# Patient Record
Sex: MALE | Race: WHITE | Employment: OTHER | URBAN - METROPOLITAN AREA
[De-identification: names, ages, dates, MRNs, and addresses within clinical notes are randomized per-mention and may not be internally consistent; named-entity substitution may affect disease eponyms.]

---

## 2017-03-23 ENCOUNTER — OFFICE VISIT (OUTPATIENT)
Dept: CARDIOLOGY | Facility: CLINIC | Age: 77
End: 2017-03-23
Attending: INTERNAL MEDICINE
Payer: COMMERCIAL

## 2017-03-23 VITALS
HEIGHT: 65 IN | SYSTOLIC BLOOD PRESSURE: 134 MMHG | WEIGHT: 214.5 LBS | BODY MASS INDEX: 35.74 KG/M2 | DIASTOLIC BLOOD PRESSURE: 70 MMHG | HEART RATE: 80 BPM

## 2017-03-23 DIAGNOSIS — I25.10 CORONARY ARTERY DISEASE INVOLVING NATIVE CORONARY ARTERY OF NATIVE HEART WITHOUT ANGINA PECTORIS: Primary | ICD-10-CM

## 2017-03-23 DIAGNOSIS — Z95.1 S/P CABG (CORONARY ARTERY BYPASS GRAFT): ICD-10-CM

## 2017-03-23 PROCEDURE — 99214 OFFICE O/P EST MOD 30 MIN: CPT | Performed by: INTERNAL MEDICINE

## 2017-03-23 RX ORDER — LOPERAMIDE HYDROCHLORIDE 2 MG/1
2 TABLET ORAL 2 TIMES DAILY PRN
COMMUNITY

## 2017-03-23 NOTE — MR AVS SNAPSHOT
"              After Visit Summary   3/23/2017    Anshul Arango    MRN: [id number]           Patient Information     Date Of Birth          1940        Visit Information        Provider Department      3/23/2017 10:45 AM Pierce Fountain MD [facility]        Today's Diagnoses     Coronary artery disease involving native coronary artery of native heart without angina pectoris    -  1    S/P CABG (coronary artery bypass graft)           Follow-ups after your visit        Additional Services     Follow-Up with Cardiologist                 Future tests that were ordered for you today     Open Future Orders        Priority Expected Expires Ordered    Follow-Up with Cardiologist Routine 3/23/2018 8/5/2018 3/23/2017    Basic metabolic panel Routine 3/23/2018 4/27/2018 3/23/2017    Lipid Profile Routine 3/23/2018 4/27/2018 3/23/2017    ALT Routine 3/23/2018 4/27/2018 3/23/2017            Who to contact     If you have questions or need follow up information about today's clinic visit or your schedule please contact [facility] directly at [phone].  Normal or non-critical lab and imaging results will be communicated to you by [employer], letter or phone within 4 business days after the clinic has received the results. If you do not hear from us within 7 days, please contact the clinic through [employer] or phone. If you have a critical or abnormal lab result, we will notify you by phone as soon as possible.  Submit refill requests through [employer] or call your pharmacy and they will forward the refill request to us. Please allow 3 business days for your refill to be completed.          Additional Information About Your Visit        Subtechhart Information     [employer] lets you send messages to your doctor, view your test results, renew your prescriptions, schedule appointments and more. To sign up, go to [email or web address] . Click on \"Log " "in\" on the left side of the screen, which will take you to the Welcome page. Then click on \"Sign up Now\" on the right side of the page.     You will be asked to enter the access code listed below, as well as some personal information. Please follow the directions to create your username and password.     Your access code is: [email or web address]  Expires: 2017 11:14 AM     Your access code will  in 90 days. If you need help or a new code, please call your [facility] clinic or [phone].        Care EveryWhere ID     This is your Care EveryWhere ID. This could be used by other organizations to access your [facility] medical records  [id number]        Your Vitals Were     Pulse Height BMI (Body Mass Index)             80 1.651 m (5' 5\") 35.69 kg/m2          Blood Pressure from Last 3 Encounters:   17 134/70   16 172/88   16 138/86    Weight from Last 3 Encounters:   17 97.3 kg (214 lb 8 oz)   16 96.2 kg (212 lb)   16 96.4 kg (212 lb 9.6 oz)              We Performed the Following     Follow-Up with Cardiologist        Primary Care Provider Office Phone # Fax #    Jorje Engel [phone]       [facility] [address] [place] [address]        Thank you!     Thank you for choosing [facility] [place]  for your care. Our goal is always to provide you with excellent care. Hearing back from our patients is one way we can continue to improve our services. Please take a few minutes to complete the written survey that you may receive in the mail after your visit with us. Thank you!             Your Updated Medication List - Protect others around you: Learn how to safely use, store and throw away your medicines at www.disposemymeds.org.          This list is accurate as of: 3/23/17 11:14 AM.  Always use your most recent med list.                   Brand Name Dispense Instructions for use    " acetaminophen 500 MG Caps      Take 500 mg by mouth as needed       ALLOPURINOL PO      Take 300 mg by mouth daily. Takes in AM       AMOXICILLIN PO      Take 2,000 mg by mouth See Admin Instructions As directed before dental work       apixaban ANTICOAGULANT 5 MG tablet    ELIQUIS    60 tablet    Take 1 tablet (5 mg) by mouth 2 times daily       aspirin 81 MG EC tablet     90 tablet    Take 1 tablet (81 mg) by mouth daily       hydrochlorothiazide 12.5 MG capsule    MICROZIDE     Take 12.5 mg by mouth every morning       loperamide 2 MG tablet    IMODIUM A-D     Take 2 mg by mouth 2 times daily       metoprolol 100 MG 24 hr tablet    TOPROL-XL     Take 100 mg by mouth daily       NITROSTAT SL      Place 0.4 mg under the tongue every 5 minutes as needed.       simvastatin 40 MG tablet    ZOCOR     Take 40 mg by mouth At Bedtime       valsartan 320 MG tablet    DIOVAN    90 tablet    Take 1 tablet (320 mg) by mouth daily       VITAMIN D3 PO      Take 2,000 Units by mouth daily

## 2017-03-23 NOTE — PROGRESS NOTES
HPI and Plan:   See dictation(#[id number])    Orders Placed This Encounter   Procedures     Basic metabolic panel     Lipid Profile     ALT     Follow-Up with Cardiologist       Orders Placed This Encounter   Medications     loperamide (IMODIUM A-D) 2 MG tablet     Sig: Take 2 mg by mouth 2 times daily       There are no discontinued medications.      Encounter Diagnoses   Name Primary?     S/P CABG (coronary artery bypass graft)      Coronary artery disease involving native coronary artery of native heart without angina pectoris Yes       CURRENT MEDICATIONS:  Current Outpatient Prescriptions   Medication Sig Dispense Refill     loperamide (IMODIUM A-D) 2 MG tablet Take 2 mg by mouth 2 times daily       hydrochlorothiazide (MICROZIDE) 12.5 MG capsule Take 12.5 mg by mouth every morning       metoprolol (TOPROL-XL) 100 MG 24 hr tablet Take 100 mg by mouth daily       aspirin 81 MG EC tablet Take 1 tablet (81 mg) by mouth daily 90 tablet 3     valsartan (DIOVAN) 320 MG tablet Take 1 tablet (320 mg) by mouth daily 90 tablet 3     acetaminophen 500 MG CAPS Take 500 mg by mouth as needed       apixaban ANTICOAGULANT (ELIQUIS) 5 MG tablet Take 1 tablet (5 mg) by mouth 2 times daily 60 tablet 3     simvastatin (ZOCOR) 40 MG tablet Take 40 mg by mouth At Bedtime        ALLOPURINOL PO Take 300 mg by mouth daily. Takes in AM       AMOXICILLIN PO Take 2,000 mg by mouth See Admin Instructions As directed before dental work       Nitroglycerin (NITROSTAT SL) Place 0.4 mg under the tongue every 5 minutes as needed.       Cholecalciferol (VITAMIN D3 PO) Take 2,000 Units by mouth daily          ALLERGIES     Allergies   Allergen Reactions     Codeine Sulfate      VERY LIGHTHEADED     Caffeine      Much dizziness & anxiety       PAST MEDICAL HISTORY:  Past Medical History:   Diagnosis Date     Acute anterior wall MI (H)      Anemia     iron deficiency     Aortic stenosis     bioprosthetic AVR 4/2013     Arthritis      CAD (coronary  artery disease)     s/p CABG (SVG to RPD) 4/2013     Cardiac arrest - ventricular fibrillation      CVA (cerebral vascular accident) (H)     2013, 2014     Gout      Hyperlipaemia      Hypertension      Inguinal hernia without mention of obstruction or gangrene, unilateral or unspecified, (not specified as recurrent)      Laryngeal cancer (H)      Osteoarthrosis      Paroxysmal atrial fibrillation (H)     20 seconds detected on Reveal     Pericardial effusion     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     Sepsis (H)      Sleep apnea     uses CPAP     Upper GI bleed     11/08       PAST SURGICAL HISTORY:  Past Surgical History:   Procedure Laterality Date     APPENDECTOMY       bilateral tka       BYPASS GRAFT ARTERY CORONARY, REPLACE VALVE AORTIC, COMBINED  4/9/2013    Procedure: COMBINED BYPASS GRAFT ARTERY CORONARY, REPLACE VALVE AORTIC;  BYPASS GRAFT ARTERY CORONARYX 1 (VEIN GRAFT TO PDA), REPLACE VALVE AORTIC (TRIFECTA 23MM), ENDOSCOPIC VEIN HARVEST(ON PUMP);  Surgeon: Lenny Rowland MD;  Location:  OR     CARDIAC SURGERY  4/2013    AVR for severe aortic stenosis with 1 vessel CABG (SVG to RPDA, 23 mm ST.Azam Trifecta tissue valve)     ENT SURGERY      t & a     HC LOOP RECORDER IMPLANT  2014    [facility]     HERNIA REPAIR       INCISION AND DRAINAGE STERNUM W/ IRRIGATION SYSTEM, COMBINED  5/30/2013    Procedure: COMBINED INCISION AND DRAINAGE STERNUM W/ IRRIGATION SYSTEM;  INCISION AND DRAINAGE OF STERNAL WOUND;  Surgeon: Lenny Rowland MD;  Location:  OR     ORTHOPEDIC SURGERY      anjel tka        FAMILY HISTORY:  Family History   Problem Relation Age of Onset     Chronic Obstructive Pulmonary Disease Mother      Alzheimer Disease Father      Myocardial Infarction Paternal Grandfather      Myocardial Infarction Brother        SOCIAL HISTORY:  Social History     Social History     Marital status:      Spouse name: N/A     Number of children: N/A     Years of education: N/A  "    Social History Main Topics     Smoking status: Former Smoker     Packs/day: 1.00     Years: 40.00     Quit date: 4/9/2000     Smokeless tobacco: None     Alcohol use Yes      Comment: one drink/day     Drug use: No     Sexual activity: Not Asked     Other Topics Concern     Parent/Sibling W/ Cabg, Mi Or Angioplasty Before 65f 55m? No     Caffeine Concern Yes     drinks de-caf     Sleep Concern Yes     sleep apnea, wears cpap at night     Stress Concern No     Weight Concern No     Special Diet No     Exercise No     nothing regular      Social History Narrative       Review of Systems:  Skin:  Negative       Eyes:  Positive for contacts    ENT:  Negative      Respiratory:  Positive for sleep apnea;CPAP     Cardiovascular:  Negative      Gastroenterology: Negative      Genitourinary:  not assessed      Musculoskeletal:  Positive for neck pain;back pain chronic  Neurologic:  Negative      Psychiatric:  Negative      Heme/Lymph/Imm:  Negative      Endocrine:  Negative        Physical Exam:  Vitals: /70  Pulse 80  Ht 1.651 m (5' 5\")  Wt 97.3 kg (214 lb 8 oz)  BMI 35.69 kg/m2    Constitutional:           Skin:           Head:           Eyes:           ENT:           Neck:           Chest:             Cardiac:                    Abdomen:           Vascular:                                          Extremities and Back:                 Neurological:                 [place] Fountain MD   [facility]  [address]  [place] [address]                  "

## 2017-03-23 NOTE — LETTER
3/23/2017    Jorje Engel MD  [facility]   [address] [facility] [place] [address]    RE: Anshul Arango       Dear Colleague,    I had the pleasure of seeing Anshul Arango in the [facility].    REASON FOR CLINIC VISIT:  Followup AVR, CAD.      Mr. Arango is a very pleasant 76-year-old gentleman with history of bioprosthetic aortic valve replacement for severe aortic stenosis along with 1-vessel CABG in 04/2013 (St.  Azam Trifecta tissue valve 23 mm, SVG to RPDA).  He has other comorbidities of recurrent stroke being followed by Neurology and had a Reveal implantable loop recorder done at [facility] and was found to have brief episode of paroxysmal atrial fibrillation and was started on apixaban, and the Reveal device was recently explanted in 12/2016.  He has other comorbidities of hypertension, dyslipidemia, longstanding history of vertigo, tobacco abuse which he quit 20 years ago.  Recently he had a Lexiscan stress test in 10/2016 that did not reveal any inducible ischemia.  He also had an echocardiogram in 10/2016 that showed normal LVEF, grade I diastolic dysfunction, moderate to severe mitral annular calcification but no mitral stenosis and normal gradient across the bioprosthetic aortic valve.      Today, he is coming for routine followup.  The patient tells me cardiac-wise he is doing well.  No chest discomfort or shortness of breath.  However, he is still struggling with balance issue and vertigo, and in the past he has been seen by [facility] and underwent vestibular rehabilitation.  He tells me that he is going to go back to the [facility] regarding his vertigo.      PHYSICAL EXAMINATION:   VITAL SIGNS:  Blood pressure 134/70, heart rate 80, regular, weight 214 pounds, BMI 35.77.   GENERAL:  The patient appears pleasant, comfortable.   NECK:  Normal JVP, no bruit.    CARDIOVASCULAR SYSTEM:  S1, S2 normal, no murmur, rub or gallop.   RESPIRATORY SYSTEM:  Clear to auscultation bilaterally.   ABDOMEN:  Soft, nontender.   EXTREMITIES:  No pitting pedal edema.   NEUROLOGICAL:  Alert, oriented x3.   PSYCHIATRIC:  Normal affect.   SKIN:  No obvious rash.   HEENT:  No pallor or icterus.     Outpatient Encounter Prescriptions as of 3/23/2017   Medication Sig Dispense Refill     loperamide (IMODIUM A-D) 2 MG tablet Take 2 mg by mouth 2 times daily       hydrochlorothiazide (MICROZIDE) 12.5 MG capsule Take 12.5 mg by mouth every morning       metoprolol (TOPROL-XL) 100 MG 24 hr tablet Take 100 mg by mouth daily       aspirin 81 MG EC tablet Take 1 tablet (81 mg) by mouth daily 90 tablet 3     valsartan (DIOVAN) 320 MG tablet Take 1 tablet (320 mg) by mouth daily 90 tablet 3     acetaminophen 500 MG CAPS Take 500 mg by mouth as needed       apixaban ANTICOAGULANT (ELIQUIS) 5 MG tablet Take 1 tablet (5 mg) by mouth 2 times daily 60 tablet 3     simvastatin (ZOCOR) 40 MG tablet Take 40 mg by mouth At Bedtime        ALLOPURINOL PO Take 300 mg by mouth daily. Takes in AM       AMOXICILLIN PO Take 2,000 mg by mouth See Admin Instructions As directed before dental work       Nitroglycerin (NITROSTAT SL) Place 0.4 mg under the tongue every 5 minutes as needed.       Cholecalciferol (VITAMIN D3 PO) Take 2,000 Units by mouth daily        No facility-administered encounter medications on file as of 3/23/2017.       IMPRESSION AND PLAN:  A very delightful 76-year-old gentleman with history of bioprosthetic aortic valve replacement, 1-vessel CABG both of these done in 2013, other comorbidities of history of recurrent stroke with paroxysmal atrial fibrillation, CHADS2-VASc score of 6, on apixaban for stroke prophylaxis, obstructive sleep apnea on CPAP, hypertension, dyslipidemia, vertigo.  He recently had Reveal implantable loop recorder device explantation.  Overall, cardiovascular status-wise he is  doing well.  He does not appear to have any anginal symptoms and does not appear in congestive heart failure.  He is on baby aspirin, valsartan, metoprolol, apixaban, simvastatin.  LDL and blood pressure are both well controlled.  If he continues to feel well cardiac-wise, we can see him back in a year's time. Continue pre dental work antibiotic prophylaxis.     Again, thank you for allowing me to participate in the care of your patient.      Sincerely,    Pierce Fountain MD

## 2017-03-24 NOTE — PROGRESS NOTES
REASON FOR CLINIC VISIT:  Followup AVR, CAD.      HISTORY OF PRESENT ILLNESS:  Mr. Arango is a very pleasant 76-year-old gentleman with history of bioprosthetic aortic valve replacement for severe aortic stenosis along with 1-vessel CABG in 04/2013 (St.  Azam Trifecta tissue valve 23 mm, SVG to RPDA).  He has other comorbidities of recurrent stroke being followed by Neurology and had a Reveal implantable loop recorder done at [facility] and was found to have brief episode of paroxysmal atrial fibrillation and was started on apixaban, and the Reveal device was recently explanted in 12/2016.  He has other comorbidities of hypertension, dyslipidemia, longstanding history of vertigo, tobacco abuse which he quit 20 years ago.  Recently he had a Lexiscan stress test in 10/2016 that did not reveal any inducible ischemia.  He also had an echocardiogram in 10/2016 that showed normal LVEF, grade I diastolic dysfunction, moderate to severe mitral annular calcification but no mitral stenosis and normal gradient across the bioprosthetic aortic valve.      Today, he is coming for routine followup.  The patient tells me cardiac-wise he is doing well.  No chest discomfort or shortness of breath.  However, he is still struggling with balance issue and vertigo, and in the past he has been seen by [facility] and underwent vestibular rehabilitation.  He tells me that he is going to go back to the [facility] regarding his vertigo.      PHYSICAL EXAMINATION:   VITAL SIGNS:  Blood pressure 134/70, heart rate 80, regular, weight 214 pounds, BMI 35.77.   GENERAL:  The patient appears pleasant, comfortable.   NECK:  Normal JVP, no bruit.   CARDIOVASCULAR SYSTEM:  S1, S2 normal, no murmur, rub or gallop.   RESPIRATORY SYSTEM:  Clear to auscultation bilaterally.   ABDOMEN:  Soft, nontender.   EXTREMITIES:  No pitting pedal edema.   NEUROLOGICAL:  Alert, oriented x3.    PSYCHIATRIC:  Normal affect.   SKIN:  No obvious rash.   HEENT:  No pallor or icterus.      IMPRESSION AND PLAN:  A very delightful 76-year-old gentleman with history of bioprosthetic aortic valve replacement, 1-vessel CABG both of these done in , other comorbidities of history of recurrent stroke with paroxysmal atrial fibrillation, CHADS2-VASc score of 6, on apixaban for stroke prophylaxis, obstructive sleep apnea on CPAP, hypertension, dyslipidemia, vertigo.  He recently had Reveal implantable loop recorder device explantation.  Overall, cardiovascular status-wise he is doing well.  He does not appear to have any anginal symptoms and does not appear in congestive heart failure.  He is on baby aspirin, valsartan, metoprolol, apixaban, simvastatin.  LDL and blood pressure are both well controlled.  If he continues to feel well cardiac-wise, we can see him back in a year's time. Continue pre dental work antibiotic prophylaxis.         ELMA SOLER MD             D: 2017 12:50   T: 2017 07:31   MT: SABINO      Name:     ADAM MULLER   MRN:      [id number]        Account:      [id number]   :      1940           Service Date: 2017      Document: [id number]

## 2017-06-05 LAB
ANION GAP SERPL CALCULATED.3IONS-SCNC: NORMAL MMOL/L
BUN SERPL-MCNC: 23 MG/DL
CALCIUM SERPL-MCNC: 9.5 MG/DL
CHLORIDE SERPLBLD-SCNC: 105 MMOL/L
CHOL/HDLC RATIO - QUEST: 3.3
CHOLEST SERPL-MCNC: 117 MG/DL
CO2 SERPL-SCNC: 28 MMOL/L
CREAT SERPL-MCNC: 1.2 MG/DL
ERYTHROCYTE [DISTWIDTH] IN BLOOD BY AUTOMATED COUNT: 15.5 %
GFR SERPL CREATININE-BSD FRML MDRD: 58 ML/MIN/1.73M2
GLUCOSE SERPL-MCNC: 106 MG/DL (ref 70–99)
HCT VFR BLD AUTO: 50.7 %
HDLC SERPL-MCNC: 35 MG/DL
HEMOGLOBIN: 16.2 G/DL
LDLC SERPL CALC-MCNC: 50 MG/DL
MAGNESIUM SERPL-MCNC: 2.3 MG/DL
MCH RBC QN AUTO: NORMAL PG
MCHC RBC AUTO-ENTMCNC: NORMAL G/DL
MCV RBC AUTO: 101.2 FL
NONHDLC SERPL-MCNC: NORMAL MG/DL
PLATELET # BLD AUTO: 171 10^9/L
POTASSIUM SERPL-SCNC: 4 MMOL/L
RBC # BLD AUTO: 5.01 10^12/L
SODIUM SERPL-SCNC: 142 MMOL/L
TRIGL SERPL-MCNC: 161 MG/DL
TSH SERPL-ACNC: 1.83 MCU/ML
WBC # BLD AUTO: 15.8 10^9/L

## 2017-06-07 LAB
ERYTHROCYTE [DISTWIDTH] IN BLOOD BY AUTOMATED COUNT: 15.6 %
HCT VFR BLD AUTO: 51 %
HEMOGLOBIN: 16.2 G/DL
MCH RBC QN AUTO: NORMAL PG
MCHC RBC AUTO-ENTMCNC: NORMAL G/DL
MCV RBC AUTO: 102.8 FL
PLATELET # BLD AUTO: 158 10^9/L
RBC # BLD AUTO: 4.96 10^12/L
WBC # BLD AUTO: 17.7 10^9/L

## 2017-06-25 ENCOUNTER — HOSPITAL ENCOUNTER (INPATIENT)
Facility: CLINIC | Age: 77
LOS: 2 days | Discharge: HOME | DRG: 378 | End: 2017-06-27
Attending: EMERGENCY MEDICINE | Admitting: INTERNAL MEDICINE
Payer: MEDICARE

## 2017-06-25 DIAGNOSIS — D62 ANEMIA DUE TO BLOOD LOSS, ACUTE: ICD-10-CM

## 2017-06-25 DIAGNOSIS — I48.91 ATRIAL FIBRILLATION (H): ICD-10-CM

## 2017-06-25 DIAGNOSIS — I48.0 PAF (PAROXYSMAL ATRIAL FIBRILLATION) (H): Primary | ICD-10-CM

## 2017-06-25 DIAGNOSIS — K92.2 UPPER GI BLEED: ICD-10-CM

## 2017-06-25 LAB
ABO + RH BLD: NORMAL
ABO + RH BLD: NORMAL
ALBUMIN SERPL-MCNC: 3.7 G/DL (ref 3.4–5)
ALP SERPL-CCNC: 65 U/L (ref 40–150)
ALT SERPL W P-5'-P-CCNC: 25 U/L (ref 0–70)
ANION GAP SERPL CALCULATED.3IONS-SCNC: 7 MMOL/L (ref 3–14)
ANISOCYTOSIS BLD QL SMEAR: SLIGHT
APTT PPP: 33 SEC (ref 22–37)
AST SERPL W P-5'-P-CCNC: 28 U/L (ref 0–45)
BASOPHILS # BLD AUTO: 0.3 10E9/L (ref 0–0.2)
BASOPHILS NFR BLD AUTO: 1 %
BILIRUB SERPL-MCNC: 0.7 MG/DL (ref 0.2–1.3)
BLD GP AB SCN SERPL QL: NORMAL
BLOOD BANK CMNT PATIENT-IMP: NORMAL
BUN SERPL-MCNC: 51 MG/DL (ref 7–30)
CALCIUM SERPL-MCNC: 8.7 MG/DL (ref 8.5–10.1)
CHLORIDE SERPL-SCNC: 110 MMOL/L (ref 94–109)
CO2 SERPL-SCNC: 24 MMOL/L (ref 20–32)
CREAT SERPL-MCNC: 1.11 MG/DL (ref 0.66–1.25)
DIFFERENTIAL METHOD BLD: ABNORMAL
EOSINOPHIL # BLD AUTO: 0 10E9/L (ref 0–0.7)
EOSINOPHIL NFR BLD AUTO: 0 %
ERYTHROCYTE [DISTWIDTH] IN BLOOD BY AUTOMATED COUNT: 16.1 % (ref 10–15)
GFR SERPL CREATININE-BSD FRML MDRD: 64 ML/MIN/1.7M2
GLUCOSE SERPL-MCNC: 101 MG/DL (ref 70–99)
HCT VFR BLD AUTO: 39.8 % (ref 40–53)
HGB BLD-MCNC: 11.7 G/DL (ref 13.3–17.7)
HGB BLD-MCNC: 12.9 G/DL (ref 13.3–17.7)
INR PPP: 1.51 (ref 0.86–1.14)
INTERPRETATION ECG - MUSE: NORMAL
LACTATE BLD-SCNC: 2.2 MMOL/L (ref 0.7–2.1)
LYMPHOCYTES # BLD AUTO: 1.9 10E9/L (ref 0.8–5.3)
LYMPHOCYTES NFR BLD AUTO: 7 %
MCH RBC QN AUTO: 33 PG (ref 26.5–33)
MCHC RBC AUTO-ENTMCNC: 32.4 G/DL (ref 31.5–36.5)
MCV RBC AUTO: 102 FL (ref 78–100)
MONOCYTES # BLD AUTO: 0.3 10E9/L (ref 0–1.3)
MONOCYTES NFR BLD AUTO: 1 %
MYELOCYTES # BLD: 0.8 10E9/L
MYELOCYTES NFR BLD MANUAL: 3 %
NEUTROPHILS # BLD AUTO: 23.3 10E9/L (ref 1.6–8.3)
NEUTROPHILS NFR BLD AUTO: 88 %
PLATELET # BLD AUTO: 207 10E9/L (ref 150–450)
PLATELET # BLD EST: ABNORMAL 10*3/UL
POLYCHROMASIA BLD QL SMEAR: SLIGHT
POTASSIUM SERPL-SCNC: 3.8 MMOL/L (ref 3.4–5.3)
PROT SERPL-MCNC: 6.7 G/DL (ref 6.8–8.8)
RBC # BLD AUTO: 3.91 10E12/L (ref 4.4–5.9)
SODIUM SERPL-SCNC: 141 MMOL/L (ref 133–144)
SPECIMEN EXP DATE BLD: NORMAL
WBC # BLD AUTO: 26.5 10E9/L (ref 4–11)

## 2017-06-25 PROCEDURE — 96365 THER/PROPH/DIAG IV INF INIT: CPT

## 2017-06-25 PROCEDURE — 85018 HEMOGLOBIN: CPT | Performed by: INTERNAL MEDICINE

## 2017-06-25 PROCEDURE — 96366 THER/PROPH/DIAG IV INF ADDON: CPT

## 2017-06-25 PROCEDURE — 12000000 ZZH R&B MED SURG/OB

## 2017-06-25 PROCEDURE — 85610 PROTHROMBIN TIME: CPT | Performed by: EMERGENCY MEDICINE

## 2017-06-25 PROCEDURE — 86901 BLOOD TYPING SEROLOGIC RH(D): CPT | Performed by: EMERGENCY MEDICINE

## 2017-06-25 PROCEDURE — 93005 ELECTROCARDIOGRAM TRACING: CPT

## 2017-06-25 PROCEDURE — 25000125 ZZHC RX 250: Performed by: INTERNAL MEDICINE

## 2017-06-25 PROCEDURE — S0164 INJECTION PANTROPRAZOLE: HCPCS | Performed by: INTERNAL MEDICINE

## 2017-06-25 PROCEDURE — 86850 RBC ANTIBODY SCREEN: CPT | Performed by: EMERGENCY MEDICINE

## 2017-06-25 PROCEDURE — 80053 COMPREHEN METABOLIC PANEL: CPT | Performed by: EMERGENCY MEDICINE

## 2017-06-25 PROCEDURE — 25000128 H RX IP 250 OP 636: Performed by: INTERNAL MEDICINE

## 2017-06-25 PROCEDURE — 83605 ASSAY OF LACTIC ACID: CPT | Performed by: INTERNAL MEDICINE

## 2017-06-25 PROCEDURE — 85025 COMPLETE CBC W/AUTO DIFF WBC: CPT | Performed by: EMERGENCY MEDICINE

## 2017-06-25 PROCEDURE — 25000128 H RX IP 250 OP 636: Performed by: EMERGENCY MEDICINE

## 2017-06-25 PROCEDURE — 85730 THROMBOPLASTIN TIME PARTIAL: CPT | Performed by: EMERGENCY MEDICINE

## 2017-06-25 PROCEDURE — 99223 1ST HOSP IP/OBS HIGH 75: CPT | Mod: AI | Performed by: INTERNAL MEDICINE

## 2017-06-25 PROCEDURE — 86900 BLOOD TYPING SEROLOGIC ABO: CPT | Performed by: EMERGENCY MEDICINE

## 2017-06-25 PROCEDURE — 25000125 ZZHC RX 250: Performed by: EMERGENCY MEDICINE

## 2017-06-25 PROCEDURE — S0164 INJECTION PANTROPRAZOLE: HCPCS | Performed by: EMERGENCY MEDICINE

## 2017-06-25 PROCEDURE — 99285 EMERGENCY DEPT VISIT HI MDM: CPT | Mod: 25

## 2017-06-25 PROCEDURE — 36415 COLL VENOUS BLD VENIPUNCTURE: CPT | Performed by: INTERNAL MEDICINE

## 2017-06-25 RX ORDER — LABETALOL HYDROCHLORIDE 5 MG/ML
10 INJECTION, SOLUTION INTRAVENOUS
Status: DISCONTINUED | OUTPATIENT
Start: 2017-06-25 | End: 2017-06-27 | Stop reason: HOSPADM

## 2017-06-25 RX ORDER — SODIUM CHLORIDE 9 MG/ML
INJECTION, SOLUTION INTRAVENOUS CONTINUOUS
Status: DISCONTINUED | OUTPATIENT
Start: 2017-06-25 | End: 2017-06-27 | Stop reason: HOSPADM

## 2017-06-25 RX ORDER — ONDANSETRON 4 MG/1
4 TABLET, ORALLY DISINTEGRATING ORAL EVERY 6 HOURS PRN
Status: DISCONTINUED | OUTPATIENT
Start: 2017-06-25 | End: 2017-06-27 | Stop reason: HOSPADM

## 2017-06-25 RX ORDER — SODIUM CHLORIDE 9 MG/ML
1000 INJECTION, SOLUTION INTRAVENOUS CONTINUOUS
Status: DISCONTINUED | OUTPATIENT
Start: 2017-06-25 | End: 2017-06-25

## 2017-06-25 RX ORDER — ONDANSETRON 2 MG/ML
4 INJECTION INTRAMUSCULAR; INTRAVENOUS EVERY 6 HOURS PRN
Status: DISCONTINUED | OUTPATIENT
Start: 2017-06-25 | End: 2017-06-26

## 2017-06-25 RX ORDER — HYDRALAZINE HYDROCHLORIDE 20 MG/ML
10 INJECTION INTRAMUSCULAR; INTRAVENOUS EVERY 4 HOURS PRN
Status: DISCONTINUED | OUTPATIENT
Start: 2017-06-25 | End: 2017-06-27 | Stop reason: HOSPADM

## 2017-06-25 RX ORDER — HYDROMORPHONE HYDROCHLORIDE 1 MG/ML
0.2 INJECTION, SOLUTION INTRAMUSCULAR; INTRAVENOUS; SUBCUTANEOUS
Status: DISCONTINUED | OUTPATIENT
Start: 2017-06-25 | End: 2017-06-27 | Stop reason: HOSPADM

## 2017-06-25 RX ORDER — NALOXONE HYDROCHLORIDE 0.4 MG/ML
.1-.4 INJECTION, SOLUTION INTRAMUSCULAR; INTRAVENOUS; SUBCUTANEOUS
Status: DISCONTINUED | OUTPATIENT
Start: 2017-06-25 | End: 2017-06-27 | Stop reason: HOSPADM

## 2017-06-25 RX ADMIN — PANTOPRAZOLE SODIUM 8 MG/HR: 40 INJECTION, POWDER, FOR SOLUTION INTRAVENOUS at 17:46

## 2017-06-25 RX ADMIN — SODIUM CHLORIDE 80 MG: 9 INJECTION, SOLUTION INTRAVENOUS at 14:46

## 2017-06-25 RX ADMIN — SODIUM CHLORIDE 8 MG/HR: 9 INJECTION, SOLUTION INTRAVENOUS at 15:10

## 2017-06-25 RX ADMIN — SODIUM CHLORIDE 500 ML: 9 INJECTION, SOLUTION INTRAVENOUS at 19:56

## 2017-06-25 RX ADMIN — SODIUM CHLORIDE 1000 ML: 9 INJECTION, SOLUTION INTRAVENOUS at 14:42

## 2017-06-25 RX ADMIN — SODIUM CHLORIDE: 9 INJECTION, SOLUTION INTRAVENOUS at 17:46

## 2017-06-25 ASSESSMENT — ACTIVITIES OF DAILY LIVING (ADL)
RETIRED_COMMUNICATION: 0-->UNDERSTANDS/COMMUNICATES WITHOUT DIFFICULTY
COGNITION: 0 - NO COGNITION ISSUES REPORTED
SWALLOWING: 0-->SWALLOWS FOODS/LIQUIDS WITHOUT DIFFICULTY
AMBULATION: 0-->INDEPENDENT
DRESS: 0-->INDEPENDENT
FALL_HISTORY_WITHIN_LAST_SIX_MONTHS: NO
TOILETING: 0-->INDEPENDENT
RETIRED_EATING: 0-->INDEPENDENT
TRANSFERRING: 0-->INDEPENDENT
BATHING: 0-->INDEPENDENT

## 2017-06-25 ASSESSMENT — ENCOUNTER SYMPTOMS
FEVER: 0
BLOOD IN STOOL: 1
NAUSEA: 0
VOMITING: 0
ABDOMINAL PAIN: 1
SHORTNESS OF BREATH: 0
DIZZINESS: 1

## 2017-06-25 NOTE — PROGRESS NOTES
Chart Check:  ER called to report they are admitting a patient with melena.  He is going to the regular floor since he is hemodynamically stable.  Has had black stools for last 5 days.  Takes Eloquis for aortic valve.  History of gastric ulcer >5 years ago.    Given that the patient is stable, we will see the patient tomorrow morning.  Hold Eloquis, start PPI drip, keep NPO after midnight, can have clear liquids today.    If this patient becomes unstable overnight feel free to call my cell phone number [phone].  Dr. Craft will  the service tomorrow morning.    Pa Wisdom MD  [facility]

## 2017-06-25 NOTE — IP AVS SNAPSHOT
[facility] [facility] [address] [facility]    [address] [place] [address]    Phone:  [phone]                                       After Visit Summary   6/25/2017    Anshul Arango    MRN: [id number]           After Visit Summary Signature Page     I have received my discharge instructions, and my questions have been answered. I have discussed any challenges I see with this plan with the nurse or doctor.    ..........................................................................................................................................  Patient/Patient Representative Signature      ..........................................................................................................................................  Patient Representative Print Name and Relationship to Patient    ..................................................               ................................................  Date                                            Time    ..........................................................................................................................................  Reviewed by Signature/Title    ...................................................              ..............................................  Date                                                            Time

## 2017-06-25 NOTE — PHARMACY-ADMISSION MEDICATION HISTORY
Admission medication history interview status for the 6/25/2017  admission is complete. See EPIC admission navigator for prior to admission medications     Medication history source reliability:Good    Actions taken by pharmacist (provider contacted, etc):None     Additional medication history information not noted on PTA med list :None    Medication reconciliation/reorder completed by provider prior to medication history? No    Time spent in this activity: 30 min     Prior to Admission medications    Medication Sig Last Dose Taking? Auth Provider   loperamide (IMODIUM A-D) 2 MG tablet Take 2 mg by mouth 2 times daily as needed   at prn  Yes Reported, Patient   hydrochlorothiazide (MICROZIDE) 12.5 MG capsule Take 12.5 mg by mouth every morning 6/25/2017 at Unknown time Yes Reported, Patient   metoprolol (TOPROL-XL) 100 MG 24 hr tablet Take 100 mg by mouth daily 6/25/2017 at Unknown time Yes Reported, Patient   aspirin 81 MG EC tablet Take 1 tablet (81 mg) by mouth daily 6/25/2017 at Unknown time Yes Pierce Fountain MD   valsartan (DIOVAN) 320 MG tablet Take 1 tablet (320 mg) by mouth daily 6/24/2017 at Unknown time Yes Miguel Esteves MD   acetaminophen 500 MG CAPS Take 500 mg by mouth daily as needed   at prn  Yes Reported, Patient   apixaban ANTICOAGULANT (ELIQUIS) 5 MG tablet Take 1 tablet (5 mg) by mouth 2 times daily 6/25/2017 at Unknown time Yes Pierce Fountain MD   simvastatin (ZOCOR) 40 MG tablet Take 40 mg by mouth At Bedtime  6/24/2017 at Unknown time Yes Reported, Patient   ALLOPURINOL PO Take 300 mg by mouth daily. Takes in AM 6/25/2017 at Unknown time Yes Reported, Patient   Nitroglycerin (NITROSTAT SL) Place 0.4 mg under the tongue every 5 minutes as needed.  at prn Yes Reported, Patient   Cholecalciferol (VITAMIN D3 PO) Take 2,000 Units by mouth daily  6/25/2017 at Unknown time Yes Reported, Patient   AMOXICILLIN PO Take 2,000 mg by mouth See Admin Instructions As directed before dental work    Reported, Patient

## 2017-06-25 NOTE — ED PROVIDER NOTES
History     Chief Complaint:  Rectal bleeding      HPI   Anshul Arango is a 76 year old male with a history of aortic valve replacement on Eliquis, GI bleed, and CAD who presents from urgent care with rectal bleeding.  The patient reports 3 days of very dark stools.  He was seen in urgent care and noted to have a hemoglobin of 12.6, down from 16.2 last month.  He has had increased gassiness and currently has some periumbilical abdominal pain but he denies any nausea or vomiting.  He has a history of GI bleed from a gastric ulcer several years ago but has not had ongoing issues with his stomach since then.  He has been taking Eliquis as prescribed.  He has about 1 alcoholic drink per day but did drink more heavily in the past.  He does not believe he has any ongoing issues with his liver.  He has felt dizzy today but denies any chest pain or shortness of breath.    Results from urgent care:  Stool occult blood: positive   CBC: WBC 27.93 (H), RBC 3.9 (H), HGB 12.6 (L), HCT 40.8, .6 (H), MCHC 30.9, RDW 16.2, , MPV 11.4    Allergies:  Codeine - lightheadedness  Caffeine - anxiety      Medications:    Eliquis  Hydrochlorothiazide   Metoprolol   Valsartan   Simvastatin   Nitroglycerin   Allopurinol   Aspirin   Tylenol   Vitamin D  Loperamide    Past Medical History:    Myocardial infarction   Aortic stenosis   Coronary artery disease   Ventricular fibrillation  Atrial fibrillation, paroxysmal  Hypertension  Hyperlipidemia   Stroke   Anemia  Gout   Laryngeal cancer   Upper GI bleed  Sleep apnea  Arthritis     Past Surgical History:    Incision and drainage, sternal wound 5/30/13  Coronary artery bypass graft, aortic valve replacement 4/9/13  Appendectomy  Total knee arthroplasty, bilateral   Tonsillectomy with adenoidectomy     Family History:    Chronic obstructive pulmonary disease - mother  Alzheimer's disease - father   Myocardial infarction - brother     Social History:  Marital Status:  "  Presents to the ED with a friend, Swapna.  Tobacco Use: Former smoker, 40 pack year history, quit 2000.   Alcohol Use: 1 drink per day  PCP: Jorje Engel     Review of Systems   Constitutional: Negative for fever.   Respiratory: Negative for shortness of breath.    Cardiovascular: Negative for chest pain.   Gastrointestinal: Positive for abdominal pain and blood in stool. Negative for nausea and vomiting.   Neurological: Positive for dizziness.   All other systems reviewed and are negative.    Physical Exam   First Vitals:  BP: 132/58  Heart Rate: 94  Temp: 98.4  F (36.9  C)  Resp: 12  Height: 167.6 cm (5' 6\")  Weight: 91.2 kg (201 lb)  SpO2: 97 %      Physical Exam  Gen: Pleasant, appears stated age.    Eye:   Pupils are equal, round, and reactive.     Sclera non-injected.    ENT:   Moist mucus membranes.     Normal tongue.    Oropharynx without lesions.    Cardiac:     Normal rate and regular rhythm.    No murmurs, gallops, or rubs.    Pulmonary:     Clear to auscultation bilaterally.    No wheezes, rales, or rhonchi.    Abdomen:     Normal active bowel sounds.     Abdomen is soft and non-distended, without focal tenderness.    Rectal: deferred, performed at [facility].    Musculoskeletal:     Normal movement of all extremities without evidence for deficit.    Extremities:    No edema.    Skin:   Warm and dry.  Bruising of LUE, subacute.    Neurologic:    Non-focal exam without asymmetric weakness or numbness.    Normal tone    Psychiatric:     Normal affect with appropriate interaction with examiner.      Emergency Department Course   ECG:  @ 1440  Indication: dizziness  Vent. Rate 100 bpm. SC interval 138 ms. QRS duration 92 ms. QT/QTc 366/472 ms. P-R-T axis 32 57 147.   Normal sinus rhythm. Possible left atrial enlargement. Cannot rule out anterior infarct, age undetermined. ST & T-wave abnormality, consider lateral ischemia.  Abnormal ECG.  No significant change when compared to previous ECG " from 6/25/15.   Read @ 1504 by Dr. Acosta.     Previous ECG from 6/25/15:  Vent. Rate 79 bpm. NY interval 154 ms. QRS duration 94 ms. QT/QTc 404/463 ms. P-R-T axis 53 69 93.   Sinus rhythm. Possible left atrial enlargement. Borderline ECG.     Laboratory:  CBC: WBC 26.5 (H), RBC 3.91 (L), HGB 12.9 (L), HCT 39.8 (L),  (H), RDW 16.1 (H), otherwise WNL ()   CMP: Cl 110 (H), Glucose 101 (H), BUN 51 (H), Total Protein 6.7 (L), otherwise WNL (Creatinine 1.11)   INR: 1.51 (H)   PTT: 33  ABO/Rh Type and Screen: O positive, antibody screen negative     Interventions:  (1442) Normal Saline, 1 liter, IV bolus   (1446) Protonix, 80 mg, IV bolus  (1510) Protonix, 8 mg/hr, IV drip    Emergency Department Course:  Nursing notes and vitals reviewed.  I performed an exam of the patient as documented above.     A peripheral IV was established.  Blood was drawn and sent for laboratory testing, results as above. The patient was placed on continuous cardiac monitoring and pulse oximetry.     EKG was done, interpretation as above.     (1607) I spoke with Dr. Fairchild of Gastroenterology regarding the patient.  She recommended ppi drip, and will evaluate the patient in the morning.    Findings and plan explained to the patient who consents to admission.   (1620) I discussed the patient with Dr. Leija of the hospitalist service, who will admit the patient to a med surg bed for further monitoring, evaluation, and treatment.     Impression & Plan      Medical Decision Making:  This is a 76 year old male on Eliquis for prosthetic heart valve who presents today for 5 days if melena and acute anemia.  On exam, the patient is hemodynamically stable and has not had any hematemesis.  He does have slightly elevated INR of unclear etiology.  He does not have any documented history of cirrhosis and has no other evidence for hepatitis.  He had his hemoglobin checked a few hours ago at urgent care and it has changed minimally since then.   He does have a history of gastric ulcerations on endoscopy several years ago that caused similar symptoms.  At this point, he remains hemodynamically stable.  I do not think he needs immediate blood transfusion or attempted reversal of Apixaban.  I discussed the case with Dr. Fairchild of [facility] who planned to evaluate the patient in the morning.  Otherwise, at this point I think he is safe to be admitted to [facility] awaiting endoscopy.      Diagnosis:    ICD-10-CM    1. Upper GI bleed K92.2    2. Anemia due to blood loss, acute D62      Disposition:  Admitted to a medical bed.      I, Lindsey Browndarlene, am serving as a scribe on 6/25/2017 at 2:30 PM to personally document services performed by Dr. Acosta based on my observations and the provider's statements to me.    Lindsey Horner  6/25/2017    EMERGENCY DEPARTMENT       Lillie Acosta MD  06/25/17 1781

## 2017-06-25 NOTE — ED NOTES
"[facility]  ED Nurse Handoff Report    ED Chief complaint: Rectal Bleeding (sent from  for active GIB. on elquis for valve replacement. h/o gastric ulcer. hgb 12.6 at clinic today, was 16 last month. c/o dizziness )      ED Diagnosis:   Final diagnoses:   Upper GI bleed   Anemia due to blood loss, acute       Code Status: Full Code    Allergies:   Allergies   Allergen Reactions     Codeine Sulfate      VERY LIGHTHEADED     Caffeine      Much dizziness & anxiety       Activity level - Baseline/Home:  Independent    Activity Level - Current:   Independent     Needed?: No    Isolation: No  Infection: Not Applicable    Bariatric?: No    Vital Signs:   Vitals:    06/25/17 1356 06/25/17 1430 06/25/17 1445 06/25/17 1500   BP: 132/58 136/80  140/70   Resp: 12   15   Temp: 98.4  F (36.9  C)      TempSrc: Oral      SpO2: 97% 98% 97% 97%   Weight: 91.2 kg (201 lb)      Height: 1.676 m (5' 6\")          Cardiac Rhythm: ,   Cardiac  Cardiac Rhythm: Sinus tachycardia    Pain level:      Is this patient confused?: No    Patient Report: Initial Complaint: GI bleeding  Focused Assessment: Patient presents to ED with 3 days of GI bleeding with dark stools with periumbilical pain. Patient was seen in  and sent to ED for further evaluation. Patient on Eliquis.   Tests Performed:See EPIC  Abnormal Results: See results  Treatments provided: See MAR    Family Comments: wife at bedside    OBS brochure/video discussed/provided to patient: N/A    ED Medications:   Medications   sodium chloride (PF) 0.9% PF flush 3 mL (not administered)   sodium chloride (PF) 0.9% PF flush 3 mL (not administered)   0.9% sodium chloride BOLUS (1,000 mLs Intravenous New Bag 6/25/17 1442)     Followed by   0.9% sodium chloride infusion (not administered)   pantoprazole (PROTONIX) 80 mg in NaCl 0.9 % 100 mL infusion (8 mg/hr Intravenous New Bag 6/25/17 1510)   pantoprazole (PROTONIX) 80 mg in NaCl 0.9 % 100 mL intermittent infusion " (0 mg Intravenous Stopped 6/25/17 5004)       Drips infusing?:  Yes    ED NURSE PHONE NUMBER: [phone]

## 2017-06-25 NOTE — IP AVS SNAPSHOT
MRN:[id number]                      After Visit Summary   6/25/2017    Anshul Arango    MRN: [id number]           Thank you!     Thank you for choosing [facility] for your care. Our goal is always to provide you with excellent care. Hearing back from our patients is one way we can continue to improve our services. Please take a few minutes to complete the written survey that you may receive in the mail after you visit with us. Thank you!        Patient Information     Date Of Birth          1940        Designated Caregiver       Most Recent Value    Caregiver    Will someone help with your care after discharge? no      About your hospital stay     You were admitted on:  June 25, 2017 You last received care in the:  [place] [address] [facility]    You were discharged on:  June 27, 2017        Reason for your hospital stay       Upper GI bleeding                  Who to Call     For medical emergencies, please call 911.  For non-urgent questions about your medical care, please call your primary care provider or clinic, [phone]  For questions related to your surgery, please call your surgery clinic        Attending Provider     Provider Specialty    Lillie Acosta MD Emergency Medicine    [place], Ashutosh Rivera MD Internal Medicine       Primary Care Provider Office Phone # Fax #    Jorje Engel [phone]      After Care Instructions     Activity       Your activity upon discharge: activity as tolerated            Diet       Follow this diet upon discharge: Orders Placed This Encounter      Regular Diet Adult                  Follow-up Appointments     Follow-up and recommended labs and tests        1) Follow up with primary care provider, Jorje Engel, within 7 days for hospital follow- up.  The following labs/tests are recommended: CBC, bmp.   2) Follow up with [facility]-Their office will contact you.                 "  Pending Results     Date and Time Order Name Status Description    2017 1207 Surgical pathology exam In process             Statement of Approval     Ordered          17 3403  I have reviewed and agree with all the recommendations and orders detailed in this document.  EFFECTIVE NOW     Approved and electronically signed by:  Evelio Boyle MD             Admission Information     Date & Time Provider Department Dept. Phone    2017 Ashutosh Leija MD [place] [address] [facility] [phone]      Your Vitals Were     Blood Pressure Pulse Temperature Respirations Height Weight    132/71 (BP Location: Right arm) 77 98.7  F (37.1  C) (Oral) 20 1.676 m (5' 6\") 91.2 kg (201 lb)    Pulse Oximetry BMI (Body Mass Index)                95% 32.44 kg/m2          MyChart Information     [employer] lets you send messages to your doctor, view your test results, renew your prescriptions, schedule appointments and more. To sign up, go to [email or web address] . Click on \"Log in\" on the left side of the screen, which will take you to the Welcome page. Then click on \"Sign up Now\" on the right side of the page.     You will be asked to enter the access code listed below, as well as some personal information. Please follow the directions to create your username and password.     Your access code is: [email or web address]  Expires: 2017 12:44 PM     Your access code will  in 90 days. If you need help or a new code, please call your [place] clinic or [phone].        Care EveryWhere ID     This is your Care EveryWhere ID. This could be used by other organizations to access your [facility] [place] medical records  [id number]        Equal Access to Services     [facility] : Hadarlette Sanchez, rebeca carmona, bijan hernández. So [facility] [phone].    ATENCIÓN: Si habla español, tiene a griffin disposición servicios gratuitos de asistencia " lingüísticabbyBatsheva Richey al [phone].    We comply with applicable federal civil rights laws and [place] laws. We do not discriminate on the basis of race, color, national origin, age, disability sex, sexual orientation or gender identity.               Review of your medicines      START taking        Dose / Directions    pantoprazole 40 MG EC tablet   Commonly known as:  PROTONIX   Used for:  Upper GI bleed        Dose:  40 mg   Take 1 tablet (40 mg) by mouth 2 times daily   Quantity:  90 tablet   Refills:  1         CONTINUE these medicines which have NOT CHANGED        Dose / Directions    acetaminophen 500 MG Caps        Dose:  500 mg   Take 500 mg by mouth daily as needed   Refills:  0       ALLOPURINOL PO        Dose:  300 mg   Take 300 mg by mouth daily. Takes in AM   Refills:  0       AMOXICILLIN PO        Dose:  2000 mg   Take 2,000 mg by mouth See Admin Instructions As directed before dental work   Refills:  0       apixaban ANTICOAGULANT 5 MG tablet   Commonly known as:  ELIQUIS   Used for:  PAF (paroxysmal atrial fibrillation) (H)        Dose:  5 mg   Start taking on:  7/1/2017   Take 1 tablet (5 mg) by mouth 2 times daily   Refills:  0       aspirin 81 MG EC tablet   Used for:  Coronary artery disease involving coronary bypass graft of native heart, angina presence unspecified, S/P AVR        Dose:  81 mg   Take 1 tablet (81 mg) by mouth daily   Quantity:  90 tablet   Refills:  3       hydrochlorothiazide 12.5 MG capsule   Commonly known as:  MICROZIDE        Dose:  12.5 mg   Take 12.5 mg by mouth every morning   Refills:  0       loperamide 2 MG tablet   Commonly known as:  IMODIUM A-D        Dose:  2 mg   Take 2 mg by mouth 2 times daily as needed   Refills:  0       metoprolol 100 MG 24 hr tablet   Commonly known as:  TOPROL-XL        Dose:  100 mg   Take 100 mg by mouth daily   Refills:  0       NITROSTAT SL        Dose:  0.4 mg   Place 0.4 mg under the tongue every 5 minutes as needed.   Refills:   0       simvastatin 40 MG tablet   Commonly known as:  ZOCOR        Dose:  40 mg   Take 40 mg by mouth At Bedtime   Refills:  0       valsartan 320 MG tablet   Commonly known as:  DIOVAN   Used for:  Essential hypertension        Dose:  320 mg   Take 1 tablet (320 mg) by mouth daily   Quantity:  90 tablet   Refills:  3       VITAMIN D3 PO        Dose:  2000 Units   Take 2,000 Units by mouth daily   Refills:  0            Where to get your medicines      These medications were sent to [facility] [place], [place] - [address]  [address], [place] [address]     Phone:  [phone]     pantoprazole 40 MG EC tablet         Some of these will need a paper prescription and others can be bought over the counter. Ask your nurse if you have questions.     You don't need a prescription for these medications     apixaban ANTICOAGULANT 5 MG tablet                Protect others around you: Learn how to safely use, store and throw away your medicines at www.disposemymeds.org.             Medication List: This is a list of all your medications and when to take them. Check marks below indicate your daily home schedule. Keep this list as a reference.      Medications           Morning Afternoon Evening Bedtime As Needed    acetaminophen 500 MG Caps   Take 500 mg by mouth daily as needed                                   ALLOPURINOL PO   Take 300 mg by mouth daily. Takes in AM   Last time this was given:  300 mg on 6/27/2017  8:48 AM                                   AMOXICILLIN PO   Take 2,000 mg by mouth See Admin Instructions As directed before dental work                                   apixaban ANTICOAGULANT 5 MG tablet   Commonly known as:  ELIQUIS   Take 1 tablet (5 mg) by mouth 2 times daily   Start taking on:  7/1/2017            Start on 7/1/17           Start on 7/1/17               aspirin 81 MG EC tablet   Take 1 tablet (81 mg) by mouth daily            Start on 6/28/17                        hydrochlorothiazide 12.5 MG capsule   Commonly known as:  MICROZIDE   Take 12.5 mg by mouth every morning                                   loperamide 2 MG tablet   Commonly known as:  IMODIUM A-D   Take 2 mg by mouth 2 times daily as needed                                   metoprolol 100 MG 24 hr tablet   Commonly known as:  TOPROL-XL   Take 100 mg by mouth daily   Last time this was given:  100 mg on 6/27/2017  8:48 AM                                   NITROSTAT SL   Place 0.4 mg under the tongue every 5 minutes as needed.                                   pantoprazole 40 MG EC tablet   Commonly known as:  PROTONIX   Take 1 tablet (40 mg) by mouth 2 times daily                                      simvastatin 40 MG tablet   Commonly known as:  ZOCOR   Take 40 mg by mouth At Bedtime   Last time this was given:  40 mg on 6/26/2017  9:42 PM                                   valsartan 320 MG tablet   Commonly known as:  DIOVAN   Take 1 tablet (320 mg) by mouth daily                                   VITAMIN D3 PO   Take 2,000 Units by mouth daily

## 2017-06-25 NOTE — H&P
PRIMARY CARE PHYSICIAN:  Jorje Engel MD      CODE STATUS:  Full code.      CHIEF COMPLAINT:  Three days of melena.      HISTORY OF PRESENT ILLNESS:  Mr. Anshul Arango is a 76-year-old male.  He has a past medical history of AV replacement.  He has a bovine aortic valve and he is on chronic Eliquis.  He thinks 10 years ago he had very similar symptoms with dark tarry stools and was seen by a gastroenterologist at that time and diagnosed with a gastric ulcer.  He was on acid-reducing medications but has not taken them for several years and has not been on any while on the Eliquis.  He says about 3 days ago he started having dark stools again.  These are more loose than they were the first time.  He has some discomfort in all 4 quadrants around the umbilicus but no nausea or vomiting.  He has not noted any bright red blood per rectum or any hematemesis.  The only other symptom he complains of I a little bit of dizziness.  His workup in the ED shows a hemoglobin of 12.6.  While this is stable, it is actually down from about 16.2 one month ago, his platelets are a little low at 203.  He has a leukocytosis of 27.9 and INR about 1.5, but no others real significant labs.  The ED physician called the on-call gastroenterologist, Dr. Fairchild from [facility], and she agreed to do a scope on the patient in the morning.  They started a Protonix drip on the patient and requested admission per Hospitalist Service.  When I went down to see the patient, he said since starting the Protonix drip his symptoms are much improved.  His pain is about 1-2 out of 10 at the moment.      ALLERGIES:  Codeine causes lightheadedness, caffeine causes dizziness and anxiety.      HOME MEDICATIONS:   1.  Imodium 2 mg 2 times daily p.r.n.   2.  Hydrochlorothiazide 12.5 mg every morning.   3.  Toprol- mg every day.   4.  Aspirin 81 mg day.   5.  Diovan 320 mg daily.   6.  Acetaminophen 500 mg daily as needed.   7.  Eliquis 5 mg 2 times  daily.   8.  Zocor 40 mg at bedtime.   9.  Allopurinol 300 mg daily.   10.  Nitroglycerin 0.4 sublingual every 5 p.r.n.   11.  Vitamin D3 2000 units daily.   12.  Amoxicillin 2 grams before dental procedures.      PAST MEDICAL HISTORY:   1.  Gastric ulcer .   2.  Sleep apnea on CPAP.   3.  Sepsis.   4.  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   5.  Paroxysmal atrial fibrillation.   6.  Osteoarthritis.   7.  Laryngeal cancer.   8.  Inguinal hernia.   9.  Hypertension.   10.  Hyperlipidemia.   11.  Gout.   12.  Stroke in .   13.  Cardiac arrest and V-fib.   14.  Coronary artery disease.   15.  Aortic stenosis, status post AVR in ,  anterior wall myocardial infarction.   16.  Iron deficiency anemia.      PAST SURGICAL HISTORY:   1.  CABG in .   2.  Aortic valve replacement .   3.  Bilateral total knee arthroplasty.   4.  Appendectomy.   5.  Tonsillectomy and  adenoidectomy.   6.  A loop recorder implantation .   7.  Hernia repair.   8.  I&D of sternum .      FAMILY HISTORY:  Significant for mother who  of complications related to COPD.  Father , cause unknown.  He suffered from Alzheimer's disease.  Neither parent had a known history of heart disease, but he does have a brother and paternal grandfather both have had MIs, age unknown.      SOCIAL HISTORY:  He is a former smoker, smoked a pack a day for 40 years, quit in year .  One drink of alcohol a day.  No illicit drug history.  He is retired,  and lives independently.      REVIEW OF SYSTEMS:  A 10-system review conducted with patient and other than those systems listed in history present illness are negative.      PHYSICAL EXAMINATION:   VITAL SIGNS:  Blood pressure 140/70, O2 sats 97% on room air, respiratory rate 15, heart rate 97, temperature 98.4 Fahrenheit.   GENERAL:  Obese, elderly appearing male in no apparent distress, alert and oriented x4.   HEENT:  Normocephalic, atraumatic.  No  oropharyngeal erythema.   NECK:  No cervical lymphadenopathy, no thyromegaly.   RESPIRATORY:  Lungs clear to auscultation bilaterally, normal work of breathing.   CARDIOVASCULAR:  Normal S1, S2, no S3, S4, regular rate and rhythm, no murmurs, rubs or gallops, 2+ pulses palpable in all 4 extremities.   ABDOMEN:  He has some mild tenderness in the periumbilical region but no rebound or guarding, no McBurney's point tenderness, no Machado's sign.  There is no hepatosplenomegaly or mass palpable.   ABDOMEN:  Soft and not distended.   EXTREMITIES:  No clubbing, cyanosis or edema.   NEUROLOGIC:  Cranial nerves II-XII are intact, 5/5 strength in all 4 extremities, sensation intact diffusely, normal coordination, bilateral finger-nose testing.      LABORATORY DATA:  Pertinent positives on metabolic panel, elevated BUN of 51, low total protein 6.7.  All other values are normal.  Other labs:  INR 1.51 which is high.      CBC has elevated white count of 22.5, a low hemoglobin 12.9 and low platelets of 207,000.  All other values are normal.      No imaging studies were conducted during this ED evaluation.      ASSESSMENT:  This is a 76-year-old male with past medical history of heart disease, aortic valve disease, hypertension, hyperlipidemia, stroke, gout, past history of gastric ulcer.  He is being admitted today due to 3 days of bowel and a hemoglobin drop, both suggestive of probable upper gastrointestinal bleed.      PLAN:   1.  Suspected upper GI bleed.  Given his past history of gastric ulcer and his recent use of Eliquis and aspirin and not being on any acid reducing meds such PPIs or H2 blockers, I think he is at higher risk for gastric bleeding.  Fortunately, he is hemodynamically stable with normal blood pressure.  His hemoglobin is a little low, but nothing we need to transfuse right now.  We will watch his hemoglobin with q.6 checks and transfuse below hemoglobin of 8.  ED consulted GI and they plan on seeing him in  the morning as they feel he is hemodynamically stable.  I think it is reasonable to hold off on EGD until tomorrow morning.  I will have clear liquids for him tonight but n.p.o. after midnight.  Dilaudid IV available for pain.  IV normal saline at 100 mL an hour.  We are going to, of course, hold his Eliquis and aspirin for now.  In fact, I plan on holding all of his p.o. medications tonight.  I will continue the Protonix drip that was started in the ED tonight, but he will likely shift over tomorrow to an oral form of PPI.   2.  History of coronary disease.  Need to hold his aspirin in lieu of probable gastrointestinal bleeding.  Will try to control his blood pressure tonight with IV medications and he should resume his statin and aspirin at the discretion of the gastroenterologist, likely 3-5 days after the procedure tomorrow.   3.  History of hypertension.  A little bit high tonight but not very severe, systolics in the 140s.  I will have IV hydralazine and labetalol available for him.   4.  History of hyperlipidemia.  Holding statin for now, will resume when cleared to resume p.o. by his gastroenterologist     5.  History of aortic valve replacement.  He had severe aortic stenosis and it was replaced with a bovine valve.  He is on daily Eliquis.  This can be held for several days while this is getting worked up, resume after his procedure depending on what kind of pathology they find.     6.  History of gout, no signs of flare at this time.  Holding allopurinol.  Hopefully he can resume this as soon.     7.  History of obstructive sleep apnea.  I will order CPAP per his prior to admission settings.     8.  Deep thrombosis prophylaxis:  None due to gastrointestinal bleed.  PCDs while in bed.   9.  Code status:  The patient confirms full code.      DISPOSITION:  I anticipate a 2 night stay for workup and stabilization of GI bleed.         MARIANNE SU MD             D: 06/25/2017 16:53   T: 06/25/2017 17:28    MT: CD      Name:     ADAM MULLER   MRN:      [id number]        Account:      [id number]   :      1940           Admitted:     [id number]      Document: [id number]       cc: Jorje Engel MD

## 2017-06-26 ENCOUNTER — ANESTHESIA (OUTPATIENT)
Dept: GASTROENTEROLOGY | Facility: CLINIC | Age: 77
DRG: 378 | End: 2017-06-26
Payer: MEDICARE

## 2017-06-26 ENCOUNTER — ANESTHESIA EVENT (OUTPATIENT)
Dept: GASTROENTEROLOGY | Facility: CLINIC | Age: 77
DRG: 378 | End: 2017-06-26
Payer: MEDICARE

## 2017-06-26 LAB
GLUCOSE BLDC GLUCOMTR-MCNC: 101 MG/DL (ref 70–99)
HGB BLD-MCNC: 10 G/DL (ref 13.3–17.7)
HGB BLD-MCNC: 10.3 G/DL (ref 13.3–17.7)
HGB BLD-MCNC: 10.7 G/DL (ref 13.3–17.7)
HGB BLD-MCNC: 10.8 G/DL (ref 13.3–17.7)
LACTATE BLD-SCNC: 1 MMOL/L (ref 0.7–2.1)
UPPER GI ENDOSCOPY: NORMAL
WBC # BLD AUTO: 17.7 10E9/L (ref 4–11)

## 2017-06-26 PROCEDURE — 88305 TISSUE EXAM BY PATHOLOGIST: CPT | Performed by: INTERNAL MEDICINE

## 2017-06-26 PROCEDURE — 99232 SBSQ HOSP IP/OBS MODERATE 35: CPT | Performed by: INTERNAL MEDICINE

## 2017-06-26 PROCEDURE — 25000128 H RX IP 250 OP 636: Performed by: NURSE ANESTHETIST, CERTIFIED REGISTERED

## 2017-06-26 PROCEDURE — 25000128 H RX IP 250 OP 636: Performed by: INTERNAL MEDICINE

## 2017-06-26 PROCEDURE — 27210582 ZZH DEVICE CLIP RESOLUTION, EACH: Performed by: INTERNAL MEDICINE

## 2017-06-26 PROCEDURE — 85048 AUTOMATED LEUKOCYTE COUNT: CPT | Performed by: INTERNAL MEDICINE

## 2017-06-26 PROCEDURE — 36415 COLL VENOUS BLD VENIPUNCTURE: CPT | Performed by: INTERNAL MEDICINE

## 2017-06-26 PROCEDURE — A9270 NON-COVERED ITEM OR SERVICE: HCPCS | Mod: GY | Performed by: INTERNAL MEDICINE

## 2017-06-26 PROCEDURE — S0164 INJECTION PANTROPRAZOLE: HCPCS | Performed by: INTERNAL MEDICINE

## 2017-06-26 PROCEDURE — 00000146 ZZHCL STATISTIC GLUCOSE BY METER IP

## 2017-06-26 PROCEDURE — 12000000 ZZH R&B MED SURG/OB

## 2017-06-26 PROCEDURE — 85018 HEMOGLOBIN: CPT | Performed by: INTERNAL MEDICINE

## 2017-06-26 PROCEDURE — 88305 TISSUE EXAM BY PATHOLOGIST: CPT | Mod: 26 | Performed by: INTERNAL MEDICINE

## 2017-06-26 PROCEDURE — 43239 EGD BIOPSY SINGLE/MULTIPLE: CPT | Performed by: INTERNAL MEDICINE

## 2017-06-26 PROCEDURE — 37000008 ZZH ANESTHESIA TECHNICAL FEE, 1ST 30 MIN: Performed by: INTERNAL MEDICINE

## 2017-06-26 PROCEDURE — 25000132 ZZH RX MED GY IP 250 OP 250 PS 637: Mod: GY | Performed by: INTERNAL MEDICINE

## 2017-06-26 PROCEDURE — 88342 IMHCHEM/IMCYTCHM 1ST ANTB: CPT | Performed by: INTERNAL MEDICINE

## 2017-06-26 PROCEDURE — 0W3P8ZZ CONTROL BLEEDING IN GASTROINTESTINAL TRACT, VIA NATURAL OR ARTIFICIAL OPENING ENDOSCOPIC: ICD-10-PCS | Performed by: INTERNAL MEDICINE

## 2017-06-26 PROCEDURE — 83605 ASSAY OF LACTIC ACID: CPT | Performed by: INTERNAL MEDICINE

## 2017-06-26 PROCEDURE — 37000009 ZZH ANESTHESIA TECHNICAL FEE, EACH ADDTL 15 MIN: Performed by: INTERNAL MEDICINE

## 2017-06-26 PROCEDURE — 0DB68ZX EXCISION OF STOMACH, VIA NATURAL OR ARTIFICIAL OPENING ENDOSCOPIC, DIAGNOSTIC: ICD-10-PCS | Performed by: INTERNAL MEDICINE

## 2017-06-26 PROCEDURE — 40000010 ZZH STATISTIC ANES STAT CODE-CRNA PER MINUTE: Performed by: INTERNAL MEDICINE

## 2017-06-26 PROCEDURE — 88342 IMHCHEM/IMCYTCHM 1ST ANTB: CPT | Mod: 26 | Performed by: INTERNAL MEDICINE

## 2017-06-26 PROCEDURE — 43236 UPPR GI SCOPE W/SUBMUC INJ: CPT | Performed by: INTERNAL MEDICINE

## 2017-06-26 PROCEDURE — 25000125 ZZHC RX 250: Performed by: INTERNAL MEDICINE

## 2017-06-26 PROCEDURE — 25000125 ZZHC RX 250: Performed by: NURSE ANESTHETIST, CERTIFIED REGISTERED

## 2017-06-26 RX ORDER — LIDOCAINE 40 MG/G
CREAM TOPICAL
Status: DISCONTINUED | OUTPATIENT
Start: 2017-06-26 | End: 2017-06-26 | Stop reason: HOSPADM

## 2017-06-26 RX ORDER — SIMVASTATIN 20 MG
40 TABLET ORAL AT BEDTIME
Status: DISCONTINUED | OUTPATIENT
Start: 2017-06-26 | End: 2017-06-27 | Stop reason: HOSPADM

## 2017-06-26 RX ORDER — PROPOFOL 10 MG/ML
INJECTION, EMULSION INTRAVENOUS CONTINUOUS PRN
Status: DISCONTINUED | OUTPATIENT
Start: 2017-06-26 | End: 2017-06-26

## 2017-06-26 RX ORDER — NALOXONE HYDROCHLORIDE 0.4 MG/ML
.1-.4 INJECTION, SOLUTION INTRAMUSCULAR; INTRAVENOUS; SUBCUTANEOUS
Status: DISCONTINUED | OUTPATIENT
Start: 2017-06-26 | End: 2017-06-27

## 2017-06-26 RX ORDER — LIDOCAINE HYDROCHLORIDE 20 MG/ML
INJECTION, SOLUTION INFILTRATION; PERINEURAL PRN
Status: DISCONTINUED | OUTPATIENT
Start: 2017-06-26 | End: 2017-06-26

## 2017-06-26 RX ORDER — ONDANSETRON 4 MG/1
4 TABLET, ORALLY DISINTEGRATING ORAL EVERY 6 HOURS PRN
Status: DISCONTINUED | OUTPATIENT
Start: 2017-06-26 | End: 2017-06-26

## 2017-06-26 RX ORDER — PROPOFOL 10 MG/ML
INJECTION, EMULSION INTRAVENOUS PRN
Status: DISCONTINUED | OUTPATIENT
Start: 2017-06-26 | End: 2017-06-26

## 2017-06-26 RX ORDER — ONDANSETRON 2 MG/ML
4 INJECTION INTRAMUSCULAR; INTRAVENOUS EVERY 6 HOURS PRN
Status: DISCONTINUED | OUTPATIENT
Start: 2017-06-26 | End: 2017-06-27 | Stop reason: HOSPADM

## 2017-06-26 RX ORDER — ALLOPURINOL 300 MG/1
300 TABLET ORAL DAILY
Status: DISCONTINUED | OUTPATIENT
Start: 2017-06-26 | End: 2017-06-27 | Stop reason: HOSPADM

## 2017-06-26 RX ORDER — FLUMAZENIL 0.1 MG/ML
0.2 INJECTION, SOLUTION INTRAVENOUS
Status: DISCONTINUED | OUTPATIENT
Start: 2017-06-26 | End: 2017-06-26

## 2017-06-26 RX ORDER — FLUMAZENIL 0.1 MG/ML
0.2 INJECTION, SOLUTION INTRAVENOUS
Status: ACTIVE | OUTPATIENT
Start: 2017-06-26 | End: 2017-06-27

## 2017-06-26 RX ORDER — SODIUM CHLORIDE, SODIUM LACTATE, POTASSIUM CHLORIDE, CALCIUM CHLORIDE 600; 310; 30; 20 MG/100ML; MG/100ML; MG/100ML; MG/100ML
INJECTION, SOLUTION INTRAVENOUS CONTINUOUS PRN
Status: DISCONTINUED | OUTPATIENT
Start: 2017-06-26 | End: 2017-06-26

## 2017-06-26 RX ORDER — METOPROLOL SUCCINATE 100 MG/1
100 TABLET, EXTENDED RELEASE ORAL DAILY
Status: DISCONTINUED | OUTPATIENT
Start: 2017-06-26 | End: 2017-06-27 | Stop reason: HOSPADM

## 2017-06-26 RX ORDER — NALOXONE HYDROCHLORIDE 0.4 MG/ML
.1-.4 INJECTION, SOLUTION INTRAMUSCULAR; INTRAVENOUS; SUBCUTANEOUS
Status: DISCONTINUED | OUTPATIENT
Start: 2017-06-26 | End: 2017-06-26

## 2017-06-26 RX ADMIN — SODIUM CHLORIDE: 9 INJECTION, SOLUTION INTRAVENOUS at 16:53

## 2017-06-26 RX ADMIN — PROPOFOL 30 MG: 10 INJECTION, EMULSION INTRAVENOUS at 11:43

## 2017-06-26 RX ADMIN — PROPOFOL 200 MCG/KG/MIN: 10 INJECTION, EMULSION INTRAVENOUS at 11:43

## 2017-06-26 RX ADMIN — ALLOPURINOL 300 MG: 300 TABLET ORAL at 16:50

## 2017-06-26 RX ADMIN — PANTOPRAZOLE SODIUM 8 MG/HR: 40 INJECTION, POWDER, FOR SOLUTION INTRAVENOUS at 03:49

## 2017-06-26 RX ADMIN — SODIUM CHLORIDE: 9 INJECTION, SOLUTION INTRAVENOUS at 08:09

## 2017-06-26 RX ADMIN — PANTOPRAZOLE SODIUM 8 MG/HR: 40 INJECTION, POWDER, FOR SOLUTION INTRAVENOUS at 14:04

## 2017-06-26 RX ADMIN — SODIUM CHLORIDE, POTASSIUM CHLORIDE, SODIUM LACTATE AND CALCIUM CHLORIDE: 600; 310; 30; 20 INJECTION, SOLUTION INTRAVENOUS at 11:40

## 2017-06-26 RX ADMIN — METOPROLOL SUCCINATE 100 MG: 100 TABLET, EXTENDED RELEASE ORAL at 16:50

## 2017-06-26 RX ADMIN — PANTOPRAZOLE SODIUM 8 MG/HR: 40 INJECTION, POWDER, FOR SOLUTION INTRAVENOUS at 22:29

## 2017-06-26 RX ADMIN — LIDOCAINE HYDROCHLORIDE 100 MG: 20 INJECTION, SOLUTION INFILTRATION; PERINEURAL at 11:42

## 2017-06-26 RX ADMIN — SIMVASTATIN 40 MG: 20 TABLET, FILM COATED ORAL at 21:42

## 2017-06-26 ASSESSMENT — LIFESTYLE VARIABLES: TOBACCO_USE: 1

## 2017-06-26 ASSESSMENT — COPD QUESTIONNAIRES: COPD: 0

## 2017-06-26 ASSESSMENT — ENCOUNTER SYMPTOMS: DYSRHYTHMIAS: 1

## 2017-06-26 NOTE — PLAN OF CARE
Problem: Goal Outcome Summary  Goal: Goal Outcome Summary  Outcome: No Change  Pt admitted from ED for black stools, decreased Hgb and pain. A&O. CMS intact. Bowel sounds active and present. HR elevated, other VSS. Pt's Hgb decreased from 12.9 in ED to 11.7 on unit. Lactic acid 2.2, Dr. Leija aware, 500 mL bolus administered. Pt NPO after midnight. Using own CPAP machine. Up independently. Denies pain. Plan NPO after midnight, likely EGD tomorrow.

## 2017-06-26 NOTE — ANESTHESIA PREPROCEDURE EVALUATION
Anesthesia Evaluation     . Pt has had prior anesthetic. Type: General and MAC    No history of anesthetic complications          ROS/MED HX    ENT/Pulmonary: Comment: Previous hoarseness, laryngeal cancer removed surgically and 30+ radiation treatments.     (+)sleep apnea, other ENT- tobacco use, Past use uses CPAP , . .   (-) asthma and COPD   Neurologic:     (+)CVA     Cardiovascular: Comment: Status:  Final result   Visible to patient:  No (Inaccessible in MyChart) Next appt:  None Dx:  DAVIS (dyspnea on exertion)   Notes Recorded by Duke Park RN on 10/5/2016 at 10:18 AM  Reviewed with patient-OV 11/3 with Dr. Soler  ------    Notes Recorded by Lili Corrales RN on 10/4/2016 at 10:44 AM  Waiting for Nuc results F/U 2016  Details     Reading Physician Reading Date Result Priority  Eduardo Nuñez MD 10/4/2016   Narrative        Interpretation Summary              [facility]  [facility]  [address] [place] [address] [address]  [place] [address]  Phone [phone]  Fax [phone]        Name: ADAM MULLER  MRN: [id number]  : 1940  Study Date: 10/04/2016 08:27 AM  Age: 76 yrs  Gender: Male  [place] [facility] [place]  Reason For Study: Other forms of dyspnea  Ordering Physician: ELMA SOLER  Referring Physician: ELMA SOLER  Performed By: Lilia Sorensen RDCS  BSA: 2.0 m2  Height: 65 in  Weight: 204 lb  HR: 80  BP: 130/84 mmHg     ______________________________________________________________________________     Procedure  Complete Echo Adult.     ______________________________________________________________________________     Interpretation Summary     Left ventricular systolic function is normal.  The visual ejection fraction is estimated at 60-65%.  There is mild concentric left ventricular hypertrophy.  Grade I or early diastolic dysfunction.  The left atrium is borderline dilated.  There is moderate  to severe mitral annular calcification.  There is no mitral valve stenosis.  There is a bioprosthetic aortic valve.  The gradient is normal for this prosthetic aortic valve.  The mean AoV pressure gradient is 8mmHg.  The ascending aorta is mildly dilated (3.9 cm).  There is no pericardial effusion.  Since the transthoracic echo of 6/26/2015, the pericardial effusion has  completly resolved. No other significant changes.            (+) Dyslipidemia, hypertension--CAD, -past MI,CABG-. : . . . :. dysrhythmias a-fib, .      (-) CHF   METS/Exercise Tolerance:  >4 METS   Hematologic:         Musculoskeletal: Comment: Cervical DJD  (+) arthritis, , , -       GI/Hepatic: Comment: Previous gastric ulcer        Renal/Genitourinary:      (-) renal disease   Endo:      (-) Type II DM   Psychiatric:         Infectious Disease:         Malignancy:         Other:                     Physical Exam  Normal systems: dental    Airway   Mallampati: II  TM distance: >3 FB  Neck ROM: full    Dental     Cardiovascular   Rhythm and rate: regular and normal      Pulmonary    breath sounds clear to auscultation                    Anesthesia Plan      History & Physical Review  History and physical reviewed and following examination; no interval change.    ASA Status:  3 .    NPO Status:  > 8 hours    Plan for MAC     Slow titration in setting of likely difficult airway      Postoperative Care      Consents  Anesthetic plan, risks, benefits and alternatives discussed with:  Patient and Spouse..                          .

## 2017-06-26 NOTE — PROGRESS NOTES
[facility]    Sepsis Evaluation Progress Note    Date of Service: 06/25/2017    I was called to see Anshul Arango due to abnormal vital signs triggering the Sepsis SIRS screening alert. He is not known to have an infection.     Physical Exam    Vital Signs:  Temp: 98.7  F (37.1  C) Temp src: Oral BP: 116/60 Pulse: 117 Heart Rate: 110 Resp: 18 SpO2: 98 % O2 Device: None (Room air)      Lab:  Lactic Acid   Date Value Ref Range Status   06/25/2017 2.2 (H) 0.7 - 2.1 mmol/L Final       The patient is at baseline mental status.    The rest of their physical exam is significant for:  Constitutional: NAD, afebrile, A+Ox4  Respiratory: CTA B  Cardiovascular: RRR, no murmur  GI: S, NT, ND, normal BS  Skin/Integumen: Dry, warm, no edema      Assessment and Plan    The SIRS and exam findings are likely due to dehydration and upper GI Bleed, there is no sign of sepsis at this time.    Disposition: The patient has an underlying condition of GI Bleed that will continue to affect their vital signs and should not have further labs drawn to assess sepsis unless their clinical appearance changes.    Ashutosh Leija MD

## 2017-06-26 NOTE — PROGRESS NOTES
GI Attending  EGD revealed two gastric ulcers in antrum.  One with visible vessel: treated with 4 ml 1:10,000 epinephrine and one hemoclip.  Biopsies were taken of underlying gastritis to rule out possible H. Pylori.  Severe duodenitis was encountered in the entire examined duodenum.  A superficial ulcer was appreciated in the second portion of the duodenum.  No stigmata of bleeding was present.    Recommendations  1) Continue with PPI gtt until tomorrow; then start PPI 40 mg po BID x 4 weeks; then once daily indefinitely  2) Clear liquid diet today  3) Our service will follow up with pathology, and initiate therapy for H. Pylori as outpatient if necessary.  Repeat EGD will also be schedule in 8 weeks to document healing  4) Hold Eloquis for five days.  Hold aspirin for next 24 hours; if no further bleeding the aspirin should be restarted    We will follow along closely.  Given visible vessel, patient should be watched in hospital for another 24 hours    Jorje Craft MD  [facility]  [phone] Cell  [phone]  Office

## 2017-06-26 NOTE — ANESTHESIA POSTPROCEDURE EVALUATION
Patient: Anshul Arango    Procedure(s):  gastroscopy - Wound Class: II-Clean Contaminated  to ulcer in stomach - Wound Class: II-Clean Contaminated    Diagnosis:melena  Diagnosis Additional Information: No value filed.    Anesthesia Type:  MAC    Note:  Anesthesia Post Evaluation    Patient location during evaluation: PACU  Patient participation: Able to fully participate in evaluation  Level of consciousness: awake  Pain management: adequate  Airway patency: patent  Cardiovascular status: acceptable  Respiratory status: acceptable  Hydration status: acceptable  PONV: none     Anesthetic complications: None          Last vitals:  Vitals:    06/26/17 1218 06/26/17 1220 06/26/17 1230   BP: 148/77 147/87 156/86   Pulse:      Resp: 8 10 21   Temp:      SpO2: 98% 98% 100%         Electronically Signed By: JUNG ADAM MD  June 26, 2017  12:40 PM

## 2017-06-26 NOTE — PLAN OF CARE
Problem: Goal Outcome Summary  Goal: Goal Outcome Summary  Outcome: Improving  Patient A&O x4. Vitals are stable, complains of slight abdominal discomfort 4/10 but no pain, declines interventions. Independent. Is on a clear liquid diet. Receiving IVF and Protonix drip until tomorrow and then will be prescribed oral protonix per doctors orders. Was sent down for EGD this morning. Results show two gastric ulcers and one duodenal ulcer all non- bleeding.  Tolerating clear liquids. Continue to monitor.

## 2017-06-26 NOTE — PROGRESS NOTES
[facility]    Hospitalist Progress Note    Date of Service (when I saw the patient): 06/26/2017    Assessment & Plan   Anshul Arango is a 76 year old male who was admitted on 6/25/2017. Past medical history of CAD with CABG, aortic stenosis with bovine valve replacement, hypertension, hyperlipidemia, PAF with stroke, gout, previous history of gastric ulcer. He was admitted with 3 days of melena.        PLAN:   1.  Upper GI bleed.  Given his past history of gastric ulcer and his recent use of Eliquis and aspirin and not being on any acid reducing meds such PPIs or H2 blockers, I think he was at higher risk for gastric bleeding. Appreciate GI consultation. EGD completed 6/26/17 shows: two gastric ulcers in antrum.  One with visible vessel: treated with 4 ml 1:10,000 epinephrine and one hemoclip.  Biopsies were taken of underlying gastritis to rule out possible H. Pylori.  Severe duodenitis was encountered in the entire examined duodenum.  A superficial ulcer was appreciated in the second portion of the duodenum.  No stigmata of bleeding was present.  Continue protonix gtt until tomorrow. Holding Asa for 24 hours and Eliquis for 5 days.   2.  History of coronary disease. History of CABG. Holding ASA until tomorrow.    3.  History of hypertension. Restart toprol xl. Holding Diovan and Hydrochlorothiazide. Continue prn IV hydralazine and labetalol available for him.   4.  History of hyperlipidemia.  Restart simvastatin.   5.  History of gout, no signs of flare at this time.  Restart allopurinol.       7.  History of obstructive sleep apnea.  Resume CPAP per his prior to admission settings.     8.  Deep thrombosis prophylaxis:  PCDs while in bed.   9.  Code status:  The patient confirms full code.       DISPOSITION:  Inpatient. I anticipate a 2 night stay for workup and stabilization of GI bleed.     Evelio Boyle MD  Pager:  [phone]  Text Page (7 am to 6 pm)      Interval History   S/P EGD.      -Data reviewed today: I reviewed all new labs and imaging results over the last 24 hours.   Physical Exam   Temp: (P) 98.5  F (36.9  C) Temp src: (P) Oral BP: (P) 127/76 Pulse: (P) 94 Heart Rate: (P) 95 Resp: (P) 20 SpO2: (P) 96 % O2 Device: None (Room air) Oxygen Delivery: 3/4 LPM  Vitals:    06/25/17 1356   Weight: 91.2 kg (201 lb)     Vital Signs with Ranges  Temp:  [96.4  F (35.8  C)-98.7  F (37.1  C)] (P) 98.5  F (36.9  C)  Pulse:  [] (P) 94  Heart Rate:  [] (P) 95  Resp:  [8-25] (P) 20  BP: (116-156)/(60-87) (P) 127/76  SpO2:  [91 %-100 %] (P) 96 %  I/O last 3 completed shifts:  In: 3297 [P.O.:940; I.V.:1357; IV Piggyback:1000]  Out: 0     GENERAL:  Pleasant, lying in bed, in no acute distress  HEAD:  NC/AT  EYES: EOMI  NECK:  Supple  CARDIAC: Regular rate and rhythm.  +S1/S2.  No murmurs, rubs, or gallops.  RESPIRATORY: Breathing unlabored.  Clear to ausculation, no wheezes, rhonchi, or rales.  GI:  Soft, nontender, nondistended, no rebound or guarding.  Active bowel sounds.  LYMPHATICS:  No cyanosis or edema.  Distal pulses palpable.  SKIN: No rashes.   NEURO: CN II-XII intact.       Medications     - MEDICATION INSTRUCTIONS -       - MEDICATION INSTRUCTIONS -       NaCl 100 mL/hr at 06/26/17 0809     pantoprazole (PROTONIX) infusion ADULT/PEDS GREATER than or EQUAL to 45 kg 8 mg/hr (06/26/17 1404)          Data     Recent Labs  Lab 06/26/17  1215 06/26/17  0615 06/26/17  0035  06/25/17  1440   WBC  --  17.7*  --   --  26.5*   HGB 10.0* 10.7* 10.3*  < > 12.9*   MCV  --   --   --   --  102*   PLT  --   --   --   --  207   INR  --   --   --   --  1.51*   NA  --   --   --   --  141   POTASSIUM  --   --   --   --  3.8   CHLORIDE  --   --   --   --  110*   CO2  --   --   --   --  24   BUN  --   --   --   --  51*   CR  --   --   --   --  1.11   ANIONGAP  --   --   --   --  7   ARMEN  --   --   --   --  8.7   GLC  --   --   --   --  101*   ALBUMIN  --   --   --   --  3.7   PROTTOTAL  --   --   --   --   6.7*   BILITOTAL  --   --   --   --  0.7   ALKPHOS  --   --   --   --  65   ALT  --   --   --   --  25   AST  --   --   --   --  28   < > = values in this interval not displayed.    No results found for this or any previous visit (from the past 24 hour(s)).

## 2017-06-26 NOTE — PLAN OF CARE
Problem: Goal Outcome Summary  Goal: Goal Outcome Summary  Outcome: No Change  Patient A&Ox4. Tachycardic, other VSS on home CPAP overnight. IVF and protonix running. NPO since midnight for scope planned for 6/26. C/O mild abdominal discomfort. Up independent. Lactic acid recheck was 1.0. Hbg at 0030 was 10.3. Will continue to monitor.

## 2017-06-26 NOTE — ANESTHESIA CARE TRANSFER NOTE
Patient: Anshul Arango    Procedure(s):  gastroscopy - Wound Class: II-Clean Contaminated    Diagnosis: melena  Diagnosis Additional Information: No value filed.    Anesthesia Type:   MAC     Note:  Airway :Nasal Cannula  Patient transferred to:Phase II  Comments: To recovery room, stable, spontaneously breathing on oxygen via nasal cannula, and comfortable.      Vitals: (Last set prior to Anesthesia Care Transfer)    CRNA VITALS  6/26/2017 1137 - 6/26/2017 1211      6/26/2017             NIBP: 107/82    Pulse: 102    NIBP Mean: 95    SpO2: 92 %    Resp Rate (set): 10                Electronically Signed By: RIGOBERTO Hearn CRNA  June 26, 2017  12:11 PM

## 2017-06-27 VITALS
HEIGHT: 66 IN | BODY MASS INDEX: 32.3 KG/M2 | OXYGEN SATURATION: 95 % | SYSTOLIC BLOOD PRESSURE: 132 MMHG | TEMPERATURE: 98.7 F | RESPIRATION RATE: 20 BRPM | HEART RATE: 77 BPM | WEIGHT: 201 LBS | DIASTOLIC BLOOD PRESSURE: 71 MMHG

## 2017-06-27 LAB
HGB BLD-MCNC: 10.2 G/DL (ref 13.3–17.7)
HGB BLD-MCNC: 9.7 G/DL (ref 13.3–17.7)

## 2017-06-27 PROCEDURE — 99239 HOSP IP/OBS DSCHRG MGMT >30: CPT | Performed by: INTERNAL MEDICINE

## 2017-06-27 PROCEDURE — 36415 COLL VENOUS BLD VENIPUNCTURE: CPT | Performed by: INTERNAL MEDICINE

## 2017-06-27 PROCEDURE — 85018 HEMOGLOBIN: CPT | Performed by: INTERNAL MEDICINE

## 2017-06-27 PROCEDURE — S0164 INJECTION PANTROPRAZOLE: HCPCS | Performed by: INTERNAL MEDICINE

## 2017-06-27 PROCEDURE — 25000132 ZZH RX MED GY IP 250 OP 250 PS 637: Mod: GY | Performed by: INTERNAL MEDICINE

## 2017-06-27 PROCEDURE — A9270 NON-COVERED ITEM OR SERVICE: HCPCS | Mod: GY | Performed by: INTERNAL MEDICINE

## 2017-06-27 PROCEDURE — 25000128 H RX IP 250 OP 636: Performed by: INTERNAL MEDICINE

## 2017-06-27 PROCEDURE — 25000125 ZZHC RX 250: Performed by: INTERNAL MEDICINE

## 2017-06-27 RX ORDER — PANTOPRAZOLE SODIUM 40 MG/1
40 TABLET, DELAYED RELEASE ORAL 2 TIMES DAILY
Qty: 90 TABLET | Refills: 1 | Status: SHIPPED | OUTPATIENT
Start: 2017-06-27

## 2017-06-27 RX ADMIN — METOPROLOL SUCCINATE 100 MG: 100 TABLET, EXTENDED RELEASE ORAL at 08:48

## 2017-06-27 RX ADMIN — ALLOPURINOL 300 MG: 300 TABLET ORAL at 08:48

## 2017-06-27 RX ADMIN — PANTOPRAZOLE SODIUM 8 MG/HR: 40 INJECTION, POWDER, FOR SOLUTION INTRAVENOUS at 09:19

## 2017-06-27 RX ADMIN — SODIUM CHLORIDE: 9 INJECTION, SOLUTION INTRAVENOUS at 02:59

## 2017-06-27 NOTE — PLAN OF CARE
Alert and oriented x4. VSS. Denies pain. No nausea or vomiting. Denies loose or black stools. Discharge paperwork gone over with pt and questions answered. Sent with belongings and medications.

## 2017-06-27 NOTE — PLAN OF CARE
Problem: Goal Outcome Summary  Goal: Goal Outcome Summary  Outcome: No change  A&O x4, VSS. Independent in room & halls. Pt denies pain when asked. Protonix drip infusing at 10ml/hr until changed to PO on 6/27. IVF infusing at 100ml/hr. Pt tolerating clear liquid diet. Pt had no BM this shift. Hgb today was 10.8 and will continue to monitor.

## 2017-06-27 NOTE — PROGRESS NOTES
GI Attending  Patient without evidence of ongoing GI bleeding.  OK to discharge home today.  Recommendations per detailed GI note yesterday.   Our service will make all outpatient follow up arrangements. We will sign off; please call anytime with questions.    Jorje Crfat MD  [facility]  [phone] Cell  [phone]  Office

## 2017-06-27 NOTE — PROGRESS NOTES
Patient refused to placed on CPAP unit and RT is monitoring.    Srinivasan Mcdonald  RT  6/26/2017

## 2017-06-27 NOTE — PLAN OF CARE
Problem: Goal Outcome Summary  Goal: Goal Outcome Summary  Outcome: No Change  Alert and oriented x4. VSS on RA. Pt used CPAP for sleep apnea. O2 sats stable at mid 90s. L/c clear. Denies pain. No c/o of N&V. +BS. No BM/black stool this shift. Up Independent to B/R. Hgb at 0000 was 9.7. No apparent s&s of active bleeding. Aspirin and eliquis on hold. Receiving IVF and protonix gtt at 10ml/hr, plan to change protonix gtt to PO today. On clear liquid diet. Nursing continue to monitor.

## 2017-06-28 LAB — COPATH REPORT: NORMAL

## 2017-07-01 NOTE — DISCHARGE SUMMARY
[facility]    Discharge Summary  Hospitalist    Date of Admission:  6/25/2017  Date of Discharge:  6/27/20117  Discharging Provider: Evelio Boyle MD  Date of Service (when I saw the patient): 6/27/17    Discharge Diagnoses   1. Acute gastric ulcers with bleeding with Acute Blood Loss Anemia.    2.  CAD    3.  Hypertension   4.  Hyperlipidemia.    5.  Gout.       6.  Obstructive sleep apnea.        History of Present Illness   Anshul Arango is an 76 year old male who presented with 3 days of melena.     Hospital Course   Anshul Arango was admitted on 6/25/2017.  The following problems were addressed during his hospitalization:    Active Problems:    GI bleed      Evelio Boyle MD    Significant Results and Procedures      EGD on 6/26/17:                                -- Normal esophagus.                             - Non-bleeding gastric ulcers with a visible                             vessel. Injected. Clip was placed.                             - Gastritis. Biopsied.                             - Duodenitis.                             - One non-bleeding duodenal ulcer with no stigmata                             of bleeding.      Pending Results   Unresulted Labs Ordered in the Past 30 Days of this Admission     No orders found from 4/26/2017 to 6/26/2017.          Code Status   Full Code       Primary Care Physician   Jorje Engel    Physical Exam                      Vitals:    06/25/17 1356   Weight: 91.2 kg (201 lb)     GENERAL:  Awake, alert. NAD.   HEAD:  NC/AT  EYES: EOMI   NECK:  Supple  CARDIAC: RRR s1s2  RESPIRATORY: Breathing unlabored.  Clear to ausculation, no wheezes, rhonchi, or rales.  GI:  Soft, nontender, nondistended, no rebound or guarding.  Active bowel sounds.  LYMPHATICS:  No cyanosis or edema.  Distal pulses palpable.  NEURO: CN II-XII intact.         Discharge Disposition   Discharged to home  Condition at discharge: Stable    Consultations This Hospital  Stay   GASTROENTEROLOGY IP CONSULT    Time Spent on this Encounter   IEvelio, personally saw the patient on discharge day and spent greater than 30 minutes discharging this patient.    Discharge Orders     Reason for your hospital stay   Upper GI bleeding     Follow-up and recommended labs and tests    1) Follow up with primary care provider, Jorje Engel, within 7 days for hospital follow- up.  The following labs/tests are recommended: CBC, bmp.   2) Follow up with [facility]-Their office will contact you.     Activity   Your activity upon discharge: activity as tolerated     Full Code     Diet   Follow this diet upon discharge: Orders Placed This Encounter     Regular Diet Adult       Discharge Medications   Discharge Medication List as of 6/27/2017 12:55 PM      START taking these medications    Details   pantoprazole (PROTONIX) 40 MG EC tablet Take 1 tablet (40 mg) by mouth 2 times daily, Disp-90 tablet, R-1, [id number] mg po bid x 1 month then 40 mg po daily         CONTINUE these medications which have CHANGED    Details   apixaban ANTICOAGULANT (ELIQUIS) 5 MG tablet Take 1 tablet (5 mg) by mouth 2 times daily, No Print OutMay restart 7/1/17 if no further evidence of GI bleeding.         CONTINUE these medications which have NOT CHANGED    Details   loperamide (IMODIUM A-D) 2 MG tablet Take 2 mg by mouth 2 times daily as needed , Historical      hydrochlorothiazide (MICROZIDE) 12.5 MG capsule Take 12.5 mg by mouth every morning, Historical      metoprolol (TOPROL-XL) 100 MG 24 hr tablet Take 100 mg by mouth daily, Historical      aspirin 81 MG EC tablet Take 1 tablet (81 mg) by mouth daily, Disp-90 tablet, R-3, E-Prescribe      valsartan (DIOVAN) 320 MG tablet Take 1 tablet (320 mg) by mouth daily, Disp-90 tablet, R-3, E-Prescribe      acetaminophen 500 MG CAPS Take 500 mg by mouth daily as needed , Historical      simvastatin (ZOCOR) 40 MG tablet Take 40 mg by mouth At Bedtime  , Historical      ALLOPURINOL PO Take 300 mg by mouth daily. Takes in AM, Historical      Nitroglycerin (NITROSTAT SL) Place 0.4 mg under the tongue every 5 minutes as needed., Historical      Cholecalciferol (VITAMIN D3 PO) Take 2,000 Units by mouth daily , Historical      AMOXICILLIN PO Take 2,000 mg by mouth See Admin Instructions As directed before dental work, Historical           Allergies   Allergies   Allergen Reactions     Codeine Sulfate      VERY LIGHTHEADED     Caffeine      Much dizziness & anxiety     Data   Most Recent 3 CBC's:  Recent Labs   Lab Test  06/27/17   0640  06/27/17   0020  06/26/17   1822   06/26/17   0615   06/25/17   1440 06/10/16  09/29/15   2100   WBC   --    --    --    --   17.7*   --   26.5*  12.9  11.1*   HGB  10.2*  9.7*  10.8*   < >  10.7*   < >  12.9*  17.5  16.9   MCV   --    --    --    --    --    --   102*  103.5  97   PLT   --    --    --    --    --    --   207  139*  127*    < > = values in this interval not displayed.      Most Recent 3 BMP's:  Recent Labs   Lab Test  06/25/17   1440  12/07/16   1245 05/19/16  09/29/15   2100   NA  141  142  142  141   POTASSIUM  3.8  4.5  4.5  3.6   CHLORIDE  110*  108  104  107   CO2  24  27   --   23   BUN  51*  17  17  20   CR  1.11  0.96  1.18  1.21   ANIONGAP  7  7   --   11   ARMEN  8.7  8.3*  9.2  7.4*   GLC  101*  97  104*  90     Most Recent 2 LFT's:  Recent Labs   Lab Test  06/25/17   1440  06/25/15   1708   AST  28  33   ALT  25  39   ALKPHOS  65  90   BILITOTAL  0.7  0.9     Most Recent INR's and Anticoagulation Dosing History:  Anticoagulation Dose History     Recent Dosing and Labs Latest Ref Rng & Units 4/1/2013 4/9/2013 4/11/2013 5/29/2013 6/29/2015 9/29/2015 6/25/2017    INR 0.86 - 1.14 1.05 1.84(H) 1.22(H) 1.17(H) 1.21(H) 1.09 1.51(H)        Most Recent 3 Troponin's:  Recent Labs   Lab Test  09/29/15   2100  06/27/15   0545  06/26/15   0515   TROPI  0.025  <0.015  The 99th percentile for upper reference range is 0.045  ug/L.  Troponin values in   the range of 0.045 - 0.120 ug/L may be associated with risks of adverse   clinical events.    0.018     Most Recent Cholesterol Panel:  Recent Labs   Lab Test 05/19/16   CHOL  121   LDL  56   HDL  44   TRIG  103     Most Recent 6 Bacteria Isolates From Any Culture (See EPIC Reports for Culture Details):  Recent Labs   Lab Test  06/27/15   1320  06/27/15   1310  06/25/15   1959  06/25/15   1943  07/19/13   1050  05/30/13   1214   CULT  No growth  No growth  No growth  No growth  Light growth Coagulase negative Staphylococcus This isolate DOES NOT demonstrate  inducible clindamycin resistance in vitro.  On day 3, isolated in broth only: Pseudomonas aeruginosa Aerobic organism isolated on anaerobic culture only. No anaerobes isolated  No growth     Most Recent TSH, T4 and A1c Labs:  Recent Labs   Lab Test  06/25/15   1708   06/03/14   0805  04/10/13   0400   TSH  3.23   < >  2.22   --    T4   --    --   1.07   --    A1C   --    --    --   4.9    < > = values in this interval not displayed.     Results for orders placed or performed during the hospital encounter of 10/04/16   NM Exercise stress test (nuc card)    Narrative    GATED MYOCARDIAL PERFUSION SCINTIGRAPHY EXERCISE- ONE DAY STUDY     10/4/2016 9:41 AM  ADAM MULLER  76 years  Male  1940.    Indication/Clinical History: Dyspnea on exertion with diaphoresis with  history of coronary artery disease    Impression  1.  Myocardial perfusion imaging using single isotope technique  demonstrated no significant perfusion defect consistent with  significant ischemia or infarct. There is slight decrease in  inferolateral activity at the base on stress greater than rest images  most likely related to diaphragm attenuation/bowel uptake  artifact(Mostly resolved on prone imaging)   2. Gated images demonstrated normal size left ventricle with good  overall contractility and no significant regional wall motion  abnormality.  The left  ventricular systolic function is normal with  ejection fraction 61%.  3. Compared to the prior study from 2/28/2013 there is no significant  inferolateral wall defect with very slight decrease activity at the  base .    Procedure  The patient performed treadmill exercise using a modified Nehemias  protocol, completing 6 minutes and 0 seconds with an estimated  workload of 3.4 METS.  The test was terminated due to fatigue. The  heart rate was 88 beats per minute at baseline and increased to 144  beats at peak exercise, which was 100% of the maximum predicted heart  rate. The rest blood pressure was 148/80 mm/Hg and peak blood pressure  is 188/80mm/Hg with rate pressure product of 27,072. The patient  experienced fatigue during the test. The patient was not on a beta  blocker.    Myocardial perfusion imaging was performed at rest, approximately 45  minutes after the injection intravenously of 521of Tc-99m Myoview. At  peak exercise, the patient was injected intravenously with 18.7 mCi of   Tc-99m Myoview and exercise continued for approximately 1 minute.  Gated post-stress tomographic imaging was performed approximately 30  minutes after stress    EKG Findings  The resting EKG demonstrated normal sinus rhythm with poor R wave  progression in the right precordial leads and mild diffuse nonspecific  ST-T wave changes primarily in the inferolateral leads . The stress  EKG demonstrated sinus tachycardia with up to 1 mm of downsloping ST  segment depression in the inferolateral leads that mostly resolved  within 2 to 3 minutes into recovery.    Tomographic Findings  Overall, the study quality is adequate . On the stress images, there  is no significant perfusion defect with only slight decrease in  inferolateral activity at the base. On the rest images, there is no  significant perfusion defect with very slight decrease in the  inferolateral activity at the base suggestive . Gated images  demonstrated normal size left  ventricle with good overall  contractility and no significant regional wall motion abnormality. The  left ventricular ejection fraction was calculated to be 61% with  stress 67% rest. TID was minimal.    HEATHER HERNANDEZ MD

## 2017-07-03 LAB
ANION GAP SERPL CALCULATED.3IONS-SCNC: NORMAL MMOL/L
BUN SERPL-MCNC: 20 MG/DL
CALCIUM SERPL-MCNC: 9.4 MG/DL
CHLORIDE SERPLBLD-SCNC: 108 MMOL/L
CO2 SERPL-SCNC: 25 MMOL/L
CREAT SERPL-MCNC: 1.3 MG/DL
GFR SERPL CREATININE-BSD FRML MDRD: 53 ML/MIN/1.73M2
GLUCOSE SERPL-MCNC: 99 MG/DL (ref 70–99)
HEMOGLOBIN: 11.6 G/DL
POTASSIUM SERPL-SCNC: 4.1 MMOL/L
SODIUM SERPL-SCNC: 143 MMOL/L

## 2017-07-06 ENCOUNTER — TELEPHONE (OUTPATIENT)
Dept: CARDIOLOGY | Facility: CLINIC | Age: 77
End: 2017-07-06

## 2017-07-06 NOTE — TELEPHONE ENCOUNTER
He can d/c asa. Given high [id number] score oral anticoagulation is recommended- i see that the gi plan was to re start elliquis 5 days after EGD. In absence of recurrence of GI bleed he can continue elliquis and stay off aspirin    Thanks  Pierce

## 2017-07-06 NOTE — TELEPHONE ENCOUNTER
Left voice message for patient to call back to discuss recommendations on oral anticoagulation of Dr. Fountain to remain on elliquis and discontinue ASA in the absence of recurrent GI bleed.   Await his call.

## 2017-07-06 NOTE — TELEPHONE ENCOUNTER
Patient reports has been treated by PMD and GI services for 3 bleeding ulcers at [facility] on 6/25-6/27/17.  On Protonix 40 mg bid for one month, then to decrease to 40 mg daily. Saw PMD Dr. Engel at [facility] yesterday. He is questioning if both ASA 81 mg and Eliquis 5 mg bid are needed. States he was off both for a few days after the ulcer treatment, but is currently back on both ASA and Eliquis. Anticoagulation therapy is for atrial fib.   History of bioprosthetic aortic valve replacement, 1-vessel CABG both of these done in 2013, other comorbidities of history of recurrent stroke with paroxysmal atrial fibrillation, CHADS2-VASc score of 6, on apixaban for stroke prophylaxis, obstructive sleep apnea on CPAP, hypertension, dyslipidemia, vertigo.  Instructed to continue the Eliquis and ASA for now. Will review with Dr. Fountain for his recommendations as patient is not convinced that he needs to remain on Anticoagulation treatment after I reviewed his history to him.

## 2017-07-07 RX ORDER — SILDENAFIL CITRATE 100 MG
100 TABLET ORAL DAILY PRN
Qty: 30 TABLET | COMMUNITY
Start: 2017-07-07

## 2017-07-07 NOTE — TELEPHONE ENCOUNTER
Patient returned call. I told him that he should stop aspirin and remain on Eliquis 5 MG BID. He told me that his PMD through [facility] prescribed him Viagra 100 MG. Before he did so, the doctor actually took from him his bottle of Nitrostat. I will remove the Nitrostat from his medication list and add Viagra 100 MG PRN. Pilar

## 2017-07-27 ENCOUNTER — HOSPITAL LABORATORY (OUTPATIENT)
Dept: OTHER | Facility: CLINIC | Age: 77
End: 2017-07-27

## 2017-07-27 LAB
APPEARANCE FLD: NORMAL
COLOR FLD: YELLOW
CRYSTALS SNV MICRO: NORMAL
GRAM STN SPEC: NORMAL
LYMPHOCYTES NFR FLD MANUAL: 61 %
MICRO REPORT STATUS: NORMAL
MONOS+MACROS NFR FLD MANUAL: 22 %
NEUTS BAND NFR FLD MANUAL: 17 %
SPECIMEN SOURCE FLD: NORMAL
SPECIMEN SOURCE: NORMAL
WBC # FLD AUTO: NORMAL /UL

## 2017-07-31 NOTE — DISCHARGE SUMMARY
[facility] [id number]  Provider:  Evelio Boyle  Patient:  Anshul Frias  MRN:  [id number]  Work type:       DATE OF ADMISSION:  06/25/2017    DATE OF DISCHARGE:  06/27/2017    DISCHARGE DIAGNOSES:   1.  Upper gastrointestinal bleeding with evidence of gastric ulcers and severe duodenitis seen on esophagogastroduodenoscopy (EGD), 06/26/2017.  2.  Coronary artery disease with history of coronary artery bypass graft.   3.  Aortic stenosis with history of bovine valve replacement.   4.  Hypertension.   5.  Hyperlipidemia.   6.  Paroxysmal atrial fibrillation with history of stroke.    7.  Gout.    8.  Obesity.   9.  Obstructive sleep apnea.    10.  Likely irritable bowel syndrome.     DISCHARGE MEDICATIONS:    1.  Protonix 40 mg p.o. b.i.d. for 1 month, then 40 mg daily.   2.  Apixaban may resume 5 mg twice a day on 07/01/2017 if no further evidence of GI bleeding.    3.  Imodium 2 mg twice a day as needed.   4.  Hydrochlorothiazide 12.5 mg every morning.   5.  Toprol  mg daily.   6.  Aspirin 81 mg daily will resume today.   7.  Diovan 320 mg daily.   8.  Acetaminophen 500 mg daily as needed.   9.  Zocor 40 mg at bedtime.   10.  Allopurinol 300 mg daily.   11.  Nitroglycerin 0.4 mg every 5 minutes as needed for chest pain.   12.  Cholecalciferol 2000 units daily.     HOSPITAL COURSE:  This is a 76-year-old gentleman with a history of multiple medical problems for which he is on daily aspirin and Eliquis anticoagulation.  The patient has a prior history of GI bleeding and gastric ulcer.  He has not been on any proton pump inhibitor recently.  The patient noted 3 days of dark, tarry stools with some abdominal discomfort.  He was noted to have slightly lower hemoglobin of 12.9.  Suspicion was for upper GI bleeding, he was started on Protonix drip.  Gastroenterology service was consulted and they did perform an EGD on 06/26/2017, the findings of which reportedly showed 2 gastric ulcers in the antrum, one with a visible  vessel treated with 4 mL of 1:10,000 epinephrine and one Hemoclip.  Biopsies were taken of underlying gastritis to rule out possible H. pylori.  Severe duodenitis was encountered in the entire examined duodenum.  Superficial ulcer appreciated in the second portion of the duodenum.  The patient was kept overnight following the EGD in order to continue the Protonix drip.  Recommendations were to restart his aspirin on 06/27/2017 if no further evidence of bleeding and Eliquis may restart on 07/01/2017.      Overnight, the patient has done well, reports no further abdominal pain, has not had any bowel movements since hospitalization.  He is awake, alert, oriented, in no acute distress.  Lungs are clear.  Cardiovascular exam regular rate and rhythm, S1, S2.  Abdomen with bowel sounds positive, nontender, nondistended.  Extremities:  Trace edema.      The patient will discharge to home.  He will follow up with his own primary care physician.  [facility] will make arrangements for further followup as an outpatient and to discuss biopsy results.  They are tentatively planning a repeat upper endoscopy in 6 to 8 weeks to document healing.  All questions answered to the best of my abilities.     Forty minutes spent on discharge planning and cares.

## 2017-08-01 LAB
BACTERIA SPEC CULT: NO GROWTH
MICRO REPORT STATUS: NORMAL
SPECIMEN SOURCE: NORMAL

## 2017-08-03 DIAGNOSIS — I10 ESSENTIAL HYPERTENSION: ICD-10-CM

## 2017-08-03 RX ORDER — VALSARTAN 320 MG/1
320 TABLET ORAL DAILY
Qty: 90 TABLET | Refills: 2 | Status: SHIPPED | OUTPATIENT
Start: 2017-08-03 | End: 2018-05-25

## 2017-10-03 ENCOUNTER — HOSPITAL LABORATORY (OUTPATIENT)
Dept: OTHER | Facility: CLINIC | Age: 77
End: 2017-10-03

## 2017-10-03 LAB
APPEARANCE FLD: NORMAL
COLOR FLD: YELLOW
CRYSTALS SNV MICRO: NORMAL
LYMPHOCYTES NFR FLD MANUAL: 37 %
MONOS+MACROS NFR FLD MANUAL: 8 %
NEUTS BAND NFR FLD MANUAL: 54 %
OTHER CELLS FLD MANUAL: 1 %
SPECIMEN SOURCE FLD: NORMAL
WBC # FLD AUTO: 4828 /UL

## 2017-10-08 LAB
BACTERIA SPEC CULT: NO GROWTH
Lab: NORMAL
SPECIMEN SOURCE: NORMAL

## 2017-10-10 ENCOUNTER — TRANSFERRED RECORDS (OUTPATIENT)
Dept: HEALTH INFORMATION MANAGEMENT | Facility: CLINIC | Age: 77
End: 2017-10-10

## 2017-10-10 LAB
ERYTHROCYTE [DISTWIDTH] IN BLOOD BY AUTOMATED COUNT: 18.1 %
HCT VFR BLD AUTO: 38.4 %
HEMOGLOBIN: 11.4 G/DL
MCH RBC QN AUTO: NORMAL PG
MCHC RBC AUTO-ENTMCNC: NORMAL G/DL
MCV RBC AUTO: 95.3 FL
PLATELET # BLD AUTO: 173 10^9/L
RBC # BLD AUTO: 4.03 10^12/L
WBC # BLD AUTO: 13.3 10^9/L

## 2017-10-12 LAB — SYNOVASURE PERIPROSTHETIC JOINT INFECTION DECTION: NORMAL

## 2017-10-17 LAB
BACTERIA SPEC CULT: NORMAL
Lab: NORMAL
SPECIMEN SOURCE: NORMAL

## 2017-11-07 DIAGNOSIS — I25.10 CORONARY ARTERY DISEASE INVOLVING NATIVE CORONARY ARTERY OF NATIVE HEART WITHOUT ANGINA PECTORIS: Primary | ICD-10-CM

## 2017-11-08 ENCOUNTER — OFFICE VISIT (OUTPATIENT)
Dept: CARDIOLOGY | Facility: CLINIC | Age: 77
End: 2017-11-08
Attending: INTERNAL MEDICINE
Payer: COMMERCIAL

## 2017-11-08 VITALS
SYSTOLIC BLOOD PRESSURE: 138 MMHG | WEIGHT: 209.4 LBS | HEIGHT: 65 IN | DIASTOLIC BLOOD PRESSURE: 70 MMHG | HEART RATE: 75 BPM | BODY MASS INDEX: 34.89 KG/M2

## 2017-11-08 DIAGNOSIS — Z95.2 S/P AVR: ICD-10-CM

## 2017-11-08 DIAGNOSIS — I25.10 CORONARY ARTERY DISEASE INVOLVING NATIVE CORONARY ARTERY OF NATIVE HEART WITHOUT ANGINA PECTORIS: Primary | ICD-10-CM

## 2017-11-08 LAB
ALT SERPL W P-5'-P-CCNC: <5 U/L (ref 5–30)
ANION GAP SERPL CALCULATED.3IONS-SCNC: 11 MMOL/L (ref 6–17)
BUN SERPL-MCNC: 19 MG/DL (ref 7–30)
CALCIUM SERPL-MCNC: 9.3 MG/DL (ref 8.5–10.5)
CHLORIDE SERPL-SCNC: 103 MMOL/L (ref 98–107)
CHOLEST SERPL-MCNC: 102 MG/DL
CO2 SERPL-SCNC: 29 MMOL/L (ref 23–29)
CREAT SERPL-MCNC: 1.28 MG/DL (ref 0.7–1.3)
GFR SERPL CREATININE-BSD FRML MDRD: 55 ML/MIN/1.7M2
GLUCOSE SERPL-MCNC: 107 MG/DL (ref 70–105)
HDLC SERPL-MCNC: 35 MG/DL
LDLC SERPL CALC-MCNC: 49 MG/DL
NONHDLC SERPL-MCNC: 67 MG/DL
POTASSIUM SERPL-SCNC: 4 MMOL/L (ref 3.5–5.1)
SODIUM SERPL-SCNC: 139 MMOL/L (ref 136–145)
TRIGL SERPL-MCNC: 88 MG/DL

## 2017-11-08 PROCEDURE — 36415 COLL VENOUS BLD VENIPUNCTURE: CPT | Performed by: INTERNAL MEDICINE

## 2017-11-08 PROCEDURE — 84460 ALANINE AMINO (ALT) (SGPT): CPT | Performed by: INTERNAL MEDICINE

## 2017-11-08 PROCEDURE — 99214 OFFICE O/P EST MOD 30 MIN: CPT | Performed by: INTERNAL MEDICINE

## 2017-11-08 PROCEDURE — 80061 LIPID PANEL: CPT | Performed by: INTERNAL MEDICINE

## 2017-11-08 PROCEDURE — 80048 BASIC METABOLIC PNL TOTAL CA: CPT | Performed by: INTERNAL MEDICINE

## 2017-11-08 NOTE — MR AVS SNAPSHOT
"              After Visit Summary   11/8/2017    Anshul Arango    MRN: [id number]           Patient Information     Date Of Birth          1940        Visit Information        Provider Department      11/8/2017 9:45 AM Pierce Fountain MD [facility]        Today's Diagnoses     Coronary artery disease involving native coronary artery of native heart without angina pectoris    -  1    S/P AVR           Follow-ups after your visit        Additional Services     Follow-Up with Cardiologist                 Future tests that were ordered for you today     Open Future Orders        Priority Expected Expires Ordered    Lipid Profile Routine 11/8/2018 11/8/2018 11/8/2017    ALT Routine 11/8/2018 11/8/2018 11/8/2017    Basic metabolic panel Routine 11/8/2018 11/9/2018 11/8/2017    Follow-Up with Cardiologist Routine 11/8/2018 11/9/2018 11/8/2017            Who to contact     If you have questions or need follow up information about today's clinic visit or your schedule please contact [facility] directly at [phone].  Normal or non-critical lab and imaging results will be communicated to you by Contactualhart, letter or phone within 4 business days after the clinic has received the results. If you do not hear from us within 7 days, please contact the clinic through Sweetgreent or phone. If you have a critical or abnormal lab result, we will notify you by phone as soon as possible.  Submit refill requests through Ascletis or call your pharmacy and they will forward the refill request to us. Please allow 3 business days for your refill to be completed.          Additional Information About Your Visit        MyChart Information     [facility] lets you send messages to your doctor, view your test results, renew your prescriptions, schedule appointments and more. To sign up, go to [email or web address] . Click on \"Log in\" on the left side of the screen, " "which will take you to the Welcome page. Then click on \"Sign up Now\" on the right side of the page.     You will be asked to enter the access code listed below, as well as some personal information. Please follow the directions to create your username and password.     Your access code is: [email or web address]  Expires: 2018 10:05 AM     Your access code will  in 90 days. If you need help or a new code, please call your [place] clinic or [phone].        Care EveryWhere ID     This is your Care EveryWhere ID. This could be used by other organizations to access your [place] medical records  [id number]        Your Vitals Were     Pulse Height BMI (Body Mass Index)             75 1.651 m (5' 5\") 34.85 kg/m2          Blood Pressure from Last 3 Encounters:   17 138/70   17 132/71   17 134/70    Weight from Last 3 Encounters:   17 95 kg (209 lb 6.4 oz)   17 91.2 kg (201 lb)   17 97.3 kg (214 lb 8 oz)              We Performed the Following     Follow-Up with Cardiologist        Primary Care Provider Office Phone # Fax #    Jorje Engel [phone]       [facility] [place] [address] [place] [address]        Equal Access to Services     [facility]: Hadii aad ku hadasho Soomaali, waaxda luqadaha, qaybta kaalmada adeegyada, bijan cote haylisa torres . So [facility] [phone].    ATENCIÓN: Si habla español, tiene a griffin disposición servicios gratuitos de asistencia lingüística. Llame al [phone].    We comply with applicable federal civil rights laws and [place] laws. We do not discriminate on the basis of race, color, national origin, age, disability, sex, sexual orientation, or gender identity.            Thank you!     Thank you for choosing [facility]  for your care. Our goal is always to provide you with excellent care. Hearing back from our patients is one way we can " continue to improve our services. Please take a few minutes to complete the written survey that you may receive in the mail after your visit with us. Thank you!             Your Updated Medication List - Protect others around you: Learn how to safely use, store and throw away your medicines at www.disposemymeds.org.          This list is accurate as of: 11/8/17 10:05 AM.  Always use your most recent med list.                   Brand Name Dispense Instructions for use Diagnosis    acetaminophen 500 MG Caps      Take 500 mg by mouth daily as needed        ALLOPURINOL PO      Take 300 mg by mouth daily. Takes in AM        AMOXICILLIN PO      Take 2,000 mg by mouth See Admin Instructions As directed before dental work        apixaban ANTICOAGULANT 5 MG tablet    ELIQUIS     Take 1 tablet (5 mg) by mouth 2 times daily    PAF (paroxysmal atrial fibrillation) (H)       hydrochlorothiazide 12.5 MG capsule    MICROZIDE     Take 12.5 mg by mouth every morning        loperamide 2 MG tablet    IMODIUM A-D     Take 2 mg by mouth 2 times daily as needed        metoprolol 100 MG 24 hr tablet    TOPROL-XL     Take 100 mg by mouth daily        pantoprazole 40 MG EC tablet    PROTONIX    90 tablet    Take 1 tablet (40 mg) by mouth 2 times daily    Upper GI bleed       simvastatin 40 MG tablet    ZOCOR     Take 40 mg by mouth At Bedtime        valsartan 320 MG tablet    DIOVAN    90 tablet    Take 1 tablet (320 mg) by mouth daily    Essential hypertension       VIAGRA 100 MG tablet   Generic drug:  sildenafil     30 tablet    Take 1 tablet (100 mg) by mouth daily as needed for erectile dysfunction        VITAMIN D3 PO      Take 2,000 Units by mouth daily

## 2017-11-08 NOTE — PROGRESS NOTES
HPI and Plan:   See dictation(#[id number])    Orders Placed This Encounter   Procedures     Lipid Profile     ALT     Basic metabolic panel     Follow-Up with Cardiologist       No orders of the defined types were placed in this encounter.      There are no discontinued medications.      Encounter Diagnoses   Name Primary?     S/P AVR      Coronary artery disease involving native coronary artery of native heart without angina pectoris Yes       CURRENT MEDICATIONS:  Current Outpatient Prescriptions   Medication Sig Dispense Refill     valsartan (DIOVAN) 320 MG tablet Take 1 tablet (320 mg) by mouth daily 90 tablet 2     sildenafil (VIAGRA) 100 MG cap/tab Take 1 tablet (100 mg) by mouth daily as needed for erectile dysfunction 30 tablet      apixaban ANTICOAGULANT (ELIQUIS) 5 MG tablet Take 1 tablet (5 mg) by mouth 2 times daily       pantoprazole (PROTONIX) 40 MG EC tablet Take 1 tablet (40 mg) by mouth 2 times daily 90 tablet 1     loperamide (IMODIUM A-D) 2 MG tablet Take 2 mg by mouth 2 times daily as needed        hydrochlorothiazide (MICROZIDE) 12.5 MG capsule Take 12.5 mg by mouth every morning       metoprolol (TOPROL-XL) 100 MG 24 hr tablet Take 100 mg by mouth daily       acetaminophen 500 MG CAPS Take 500 mg by mouth daily as needed        simvastatin (ZOCOR) 40 MG tablet Take 40 mg by mouth At Bedtime        ALLOPURINOL PO Take 300 mg by mouth daily. Takes in AM       AMOXICILLIN PO Take 2,000 mg by mouth See Admin Instructions As directed before dental work       Cholecalciferol (VITAMIN D3 PO) Take 2,000 Units by mouth daily          ALLERGIES     Allergies   Allergen Reactions     Codeine Sulfate      VERY LIGHTHEADED     Caffeine      Much dizziness & anxiety       PAST MEDICAL HISTORY:  Past Medical History:   Diagnosis Date     Acute anterior wall MI (H)      Anemia     iron deficiency     Aortic stenosis     bioprosthetic AVR 4/2013     Arthritis      CAD (coronary artery disease)     s/p CABG (SVG to  RPD) 4/2013     Cardiac arrest - ventricular fibrillation      CVA (cerebral vascular accident) (H)     2013, 2014     Gout      Hyperlipaemia      Hypertension      Inguinal hernia without mention of obstruction or gangrene, unilateral or unspecified, (not specified as recurrent)      Laryngeal cancer (H)      Osteoarthrosis      Paroxysmal atrial fibrillation (H)     20 seconds detected on Reveal     Pericardial effusion     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     Sepsis (H)      Sleep apnea     uses CPAP     Upper GI bleed     11/08       PAST SURGICAL HISTORY:  Past Surgical History:   Procedure Laterality Date     APPENDECTOMY       bilateral tka       BYPASS GRAFT ARTERY CORONARY, REPLACE VALVE AORTIC, COMBINED  4/9/2013    Procedure: COMBINED BYPASS GRAFT ARTERY CORONARY, REPLACE VALVE AORTIC;  BYPASS GRAFT ARTERY CORONARYX 1 (VEIN GRAFT TO PDA), REPLACE VALVE AORTIC (TRIFECTA 23MM), ENDOSCOPIC VEIN HARVEST(ON PUMP);  Surgeon: Lenny Rowland MD;  Location:  OR     CARDIAC SURGERY  4/2013    AVR for severe aortic stenosis with 1 vessel CABG (SVG to RPDA, 23 mm ST.Azam Trifecta tissue valve)     ENT SURGERY      t & a     ESOPHAGOSCOPY, GASTROSCOPY, DUODENOSCOPY (EGD), COMBINED N/A 6/26/2017    Procedure: COMBINED ESOPHAGOSCOPY, GASTROSCOPY, DUODENOSCOPY (EGD), BIOPSY SINGLE OR MULTIPLE;  gastroscopy;  Surgeon: Jorje Craft MD;  Location:  GI     HC LOOP RECORDER IMPLANT  2014    [facility]     HERNIA REPAIR       INCISION AND DRAINAGE STERNUM W/ IRRIGATION SYSTEM, COMBINED  5/30/2013    Procedure: COMBINED INCISION AND DRAINAGE STERNUM W/ IRRIGATION SYSTEM;  INCISION AND DRAINAGE OF STERNAL WOUND;  Surgeon: Lenny Rowland MD;  Location:  OR     ORTHOPEDIC SURGERY      anjel tka        FAMILY HISTORY:  Family History   Problem Relation Age of Onset     Chronic Obstructive Pulmonary Disease Mother      Alzheimer Disease Father      Myocardial Infarction Paternal Grandfather   "    Myocardial Infarction Brother        SOCIAL HISTORY:  Social History     Social History     Marital status:      Spouse name: N/A     Number of children: N/A     Years of education: N/A     Social History Main Topics     Smoking status: Former Smoker     Packs/day: 1.00     Years: 40.00     Types: Cigarettes, Cigars     Start date: 1960     Quit date: 4/9/2000     Smokeless tobacco: Never Used     Alcohol use Yes      Comment: one drink/day     Drug use: No     Sexual activity: Not Asked     Other Topics Concern     Parent/Sibling W/ Cabg, Mi Or Angioplasty Before 65f 55m? No     Caffeine Concern Yes     drinks de-caf     Sleep Concern Yes     sleep apnea, wears cpap at night     Stress Concern No     Weight Concern No     Special Diet No     Exercise No     nothing regular      Social History Narrative       Review of Systems:  Skin:  Negative       Eyes:  Positive for contacts    ENT:  Positive for nasal congestion chronic  Respiratory:  Positive for dyspnea on exertion;sleep apnea;CPAP     Cardiovascular:    Positive for;dizziness    Gastroenterology: Negative      Genitourinary:  Negative      Musculoskeletal:  Negative      Neurologic:  Positive for numbness or tingling of feet    Psychiatric:  Negative      Heme/Lymph/Imm:  Positive for allergies seasonal  Endocrine:  Negative        Physical Exam:  Vitals: /70  Pulse 75  Ht 1.651 m (5' 5\")  Wt 95 kg (209 lb 6.4 oz)  BMI 34.85 kg/m2    Constitutional:           Skin:             Head:           Eyes:           Lymph:      ENT:           Neck:           Respiratory:            Cardiac:                                                           GI:           Extremities and Muscular Skeletal:                 Neurological:           Psych:             [place] Fountain MD  [address]  [place] [address]                  "

## 2017-11-08 NOTE — LETTER
11/8/2017    Jorje Engel MD  [facility] [address] [facility] [place] [address]      RE: Anshul Arango       Dear Colleague,    I had the pleasure of seeing Anshul Arango in the [facility].    REASON FOR CLINIC VISIT:  Follow aortic valve replacement and coronary artery disease.      HISTORY OF PRESENT ILLNESS:  Mr. Arango is a very pleasant 77-year-old gentleman with history of bioprosthetic aortic valve replacement for severe aortic stenosis along with 1-vessel CABG in 04/2013 (St.  Azam trifecta valve 23 mm and SVG to RPDA).  He has other comorbidities of recurrent stroke and was followed by Neurology and had a Reveal implantable loop recorder at [facility] and was found to have brief episode of proximal atrial fibrillation.  The Reveal device was explanted in 12/2016.  He was started on apixaban.  He has other comorbidities of hypertension, dyslipidemia.        He had stress test in 10/2016 and that did not reveal any ischemia.  He also had an echocardiogram at that time that showed normal LVEF, diastolic dysfunction, moderate to severe mitral annular calcification but no mitral stenosis and normal gradient across the bioprosthetic ortic valve.        Today is coming for routine followup.  Remarkably this summer the patient was admitted with melena and anemia and was found to have a gastric ulcer and duodenal ulcer and underwent treatment for same.  He was started on PPI and eventually started back on apixaban.  Aspirin was discontinued.  His hemoglobin used to run around 16, and at the time of his admission for anemia and GI bleed, his hemoglobin was as low as 9.7 and most recently it has come up to 11.4.  However, the patient tells me that he still struggles with some fatigue.  He has not noticed any melena anymore.  No chest discomfort or shortness of breath.  He is also on Viagra for erectile dysfunction and despite 100 mg  dose, he has not seen much improvement.        In terms of cardiac medication, as noted above, he is on:     1.  Apixaban.  Aspirin has been discontinued to avoid increased risk of adverse effect on both oral anticoagulation and antiplatelet therapy.     2.  Valsartan 320 mg daily.   3.  Toprol- mg daily.   4.  Simvastatin 40 mg daily.        Lipid panel on 10/30 shows LDL well controlled at 49.  BMP shows a stable creatinine and normal potassium and sodium.      PHYSICAL EXAMINATION:   VITAL SIGNS:  Blood pressure 138/70, heart rate 75, regular, weight 209 pounds, BMI 34.92.   GENERAL:  The patient appears pleasant, comfortable.   NECK:  Normal JVP, no bruit.   CARDIOVASCULAR SYSTEM:  There is grade 2/6 ejection systolic murmur best over the right upper sternal border, no S3, S4, rub or gallop.   RESPIRATORY SYSTEM:  Clear to auscultation bilaterally.   GASTROINTESTINAL:  Abdomen is soft, nontender.   EXTREMITIES:  No pitting pedal edema.   NEUROLOGICAL:  Alert course x3.   PSYCH:  Normal affect.   SKIN:  No obvious rash.   HEENT:  No pallor or icterus.      IMPRESSION AND PLAN:  A pleasant 77-year-old gentleman with history of bioprosthetic aortic valve replacement with single vessel CABG with history of prior stroke and was found to have atrial fibrillation paroxysmal episode on implantable loop recorder; other comorbidities of hypertension, recent history of GI bleed due to peptic ulcer disease.        Overall, cardiac status-wise he is doing well.  His hemoglobin is still low compared to his baseline and patient tells me that he will be seeing his primary care physician later this month to discuss about iron supplementation and possible additional GI evaluation, although to be noted he does not have any melena.      He is also on Viagra and has not responded to the current dose in terms of erectile dysfunction, and patient tells me that he will be discussing with his primary doctor about further  management options with possibility of maybe even seeing a urologist.  To be noted, he has been on long-term beta blocker therapy, and in fact was on 150 mg more than a year ago and has been on a stable dose of 100 mg.  Beta blocker can potentially also contribute to erectile dysfunction, but as noted above, he has been tolerating the current dose quite well.  For now, my recommendation is to continue beta blocker in the current dose, but if his primary care physician or eventually urologist feel that medication needs to be adjusted, one option to consider is to go down on beta blocker.        If cardiac status-wise, he continues to feel well, we can see him back in 1 year, sooner if any change in clinical status, especially any exertional related chest discomfort, shortness of breath.     Outpatient Encounter Prescriptions as of 11/8/2017   Medication Sig Dispense Refill     valsartan (DIOVAN) 320 MG tablet Take 1 tablet (320 mg) by mouth daily 90 tablet 2     sildenafil (VIAGRA) 100 MG cap/tab Take 1 tablet (100 mg) by mouth daily as needed for erectile dysfunction 30 tablet      apixaban ANTICOAGULANT (ELIQUIS) 5 MG tablet Take 1 tablet (5 mg) by mouth 2 times daily       pantoprazole (PROTONIX) 40 MG EC tablet Take 1 tablet (40 mg) by mouth 2 times daily 90 tablet 1     loperamide (IMODIUM A-D) 2 MG tablet Take 2 mg by mouth 2 times daily as needed        hydrochlorothiazide (MICROZIDE) 12.5 MG capsule Take 12.5 mg by mouth every morning       metoprolol (TOPROL-XL) 100 MG 24 hr tablet Take 100 mg by mouth daily       acetaminophen 500 MG CAPS Take 500 mg by mouth daily as needed        simvastatin (ZOCOR) 40 MG tablet Take 40 mg by mouth At Bedtime        ALLOPURINOL PO Take 300 mg by mouth daily. Takes in AM       AMOXICILLIN PO Take 2,000 mg by mouth See Admin Instructions As directed before dental work       Cholecalciferol (VITAMIN D3 PO) Take 2,000 Units by mouth daily        No facility-administered  encounter medications on file as of 11/8/2017.        Again, thank you for allowing me to participate in the care of your patient.      Sincerely,    Pierce Fountain MD     [facility]

## 2017-11-08 NOTE — PROGRESS NOTES
REASON FOR CLINIC VISIT:  Follow aortic valve replacement and coronary artery disease.      HISTORY OF PRESENT ILLNESS:  Mr. Arango is a very pleasant 77-year-old gentleman with history of bioprosthetic aortic valve replacement for severe aortic stenosis along with 1-vessel CABG in 04/2013 (St.  Azam trifecta valve 23 mm and SVG to RPDA).  He has other comorbidities of recurrent stroke and was followed by Neurology and had a Reveal implantable loop recorder at [facility] [place] and was found to have brief episode of proximal atrial fibrillation.  The Reveal device was explanted in 12/2016.  He was started on apixaban.  He has other comorbidities of hypertension, dyslipidemia.        He had stress test in 10/2016 and that did not reveal any ischemia.  He also had an echocardiogram at that time that showed normal LVEF, diastolic dysfunction, moderate to severe mitral annular calcification but no mitral stenosis and normal gradient across the bioprosthetic ortic valve.        Today is coming for routine followup.  Remarkably this summer the patient was admitted with melena and anemia and was found to have a gastric ulcer and duodenal ulcer and underwent treatment for same.  He was started on PPI and eventually started back on apixaban.  Aspirin was discontinued.  His hemoglobin used to run around 16, and at the time of his admission for anemia and GI bleed, his hemoglobin was as low as 9.7 and most recently it has come up to 11.4.  However, the patient tells me that he still struggles with some fatigue.  He has not noticed any melena anymore.  No chest discomfort or shortness of breath.  He is also on Viagra for erectile dysfunction and despite 100 mg dose, he has not seen much improvement.        In terms of cardiac medication, as noted above, he is on:     1.  Apixaban.  Aspirin has been discontinued to avoid increased risk of adverse effect on both oral anticoagulation and antiplatelet therapy.     2.   Valsartan 320 mg daily.   3.  Toprol- mg daily.   4.  Simvastatin 40 mg daily.        Lipid panel on 10/30 shows LDL well controlled at 49.  BMP shows a stable creatinine and normal potassium and sodium.      PHYSICAL EXAMINATION:   VITAL SIGNS:  Blood pressure 138/70, heart rate 75, regular, weight 209 pounds, BMI 34.92.   GENERAL:  The patient appears pleasant, comfortable.   NECK:  Normal JVP, no bruit.   CARDIOVASCULAR SYSTEM:  There is grade 2/6 ejection systolic murmur best over the right upper sternal border, no S3, S4, rub or gallop.   RESPIRATORY SYSTEM:  Clear to auscultation bilaterally.   GASTROINTESTINAL:  Abdomen is soft, nontender.   EXTREMITIES:  No pitting pedal edema.   NEUROLOGICAL:  Alert course x3.   PSYCH:  Normal affect.   SKIN:  No obvious rash.   HEENT:  No pallor or icterus.      IMPRESSION AND PLAN:  A pleasant 77-year-old gentleman with history of bioprosthetic aortic valve replacement with single vessel CABG with history of prior stroke and was found to have atrial fibrillation paroxysmal episode on implantable loop recorder; other comorbidities of hypertension, recent history of GI bleed due to peptic ulcer disease.        Overall, cardiac status-wise he is doing well.  His hemoglobin is still low compared to his baseline and patient tells me that he will be seeing his primary care physician later this month to discuss about iron supplementation and possible additional GI evaluation, although to be noted he does not have any melena.      He is also on Viagra and has not responded to the current dose in terms of erectile dysfunction, and patient tells me that he will be discussing with his primary doctor about further management options with possibility of maybe even seeing a urologist.  To be noted, he has been on long-term beta blocker therapy, and in fact was on 150 mg more than a year ago and has been on a stable dose of 100 mg.  Beta blocker can potentially also contribute to  erectile dysfunction, but as noted above, he has been tolerating the current dose quite well.  For now, my recommendation is to continue beta blocker in the current dose, but if his primary care physician or eventually urologist feel that medication needs to be adjusted, one option to consider is to go down on beta blocker.        If cardiac status-wise, he continues to feel well, we can see him back in 1 year, sooner if any change in clinical status, especially any exertional related chest discomfort, shortness of breath.         ELMA SOLER MD             D: 2017 10:13   T: 2017 10:51   MT: EDER      Name:     ADAM MULLER   MRN:      [id number]        Account:      [id number]   :      1940           Service Date: 2017      Document: [id number]

## 2018-01-01 ENCOUNTER — TELEPHONE (OUTPATIENT)
Dept: ONCOLOGY | Facility: CLINIC | Age: 78
End: 2018-01-01

## 2018-01-01 ENCOUNTER — OFFICE VISIT (OUTPATIENT)
Dept: TRANSPLANT | Facility: CLINIC | Age: 78
End: 2018-01-01
Attending: INTERNAL MEDICINE
Payer: MEDICARE

## 2018-01-01 ENCOUNTER — OFFICE VISIT (OUTPATIENT)
Dept: CARDIOLOGY | Facility: CLINIC | Age: 78
End: 2018-01-01
Payer: COMMERCIAL

## 2018-01-01 ENCOUNTER — TRANSFERRED RECORDS (OUTPATIENT)
Dept: HEALTH INFORMATION MANAGEMENT | Facility: CLINIC | Age: 78
End: 2018-01-01

## 2018-01-01 ENCOUNTER — TELEPHONE (OUTPATIENT)
Dept: CARDIOLOGY | Facility: CLINIC | Age: 78
End: 2018-01-01

## 2018-01-01 ENCOUNTER — PRE VISIT (OUTPATIENT)
Dept: TRANSPLANT | Facility: CLINIC | Age: 78
End: 2018-01-01

## 2018-01-01 VITALS
OXYGEN SATURATION: 97 % | WEIGHT: 207 LBS | DIASTOLIC BLOOD PRESSURE: 68 MMHG | BODY MASS INDEX: 33.27 KG/M2 | HEART RATE: 72 BPM | SYSTOLIC BLOOD PRESSURE: 130 MMHG | HEIGHT: 66 IN

## 2018-01-01 DIAGNOSIS — I25.10 CAD (CORONARY ARTERY DISEASE): ICD-10-CM

## 2018-01-01 DIAGNOSIS — Z95.2 S/P AVR: Primary | ICD-10-CM

## 2018-01-01 DIAGNOSIS — C91.00 ACUTE LYMPHOBLASTIC LEUKEMIA (ALL) NOT HAVING ACHIEVED REMISSION (H): Primary | ICD-10-CM

## 2018-01-01 DIAGNOSIS — E78.5 HYPERLIPIDEMIA: ICD-10-CM

## 2018-01-01 DIAGNOSIS — I10 HYPERTENSION: ICD-10-CM

## 2018-01-01 DIAGNOSIS — E78.5 HYPERLIPIDEMIA: Primary | ICD-10-CM

## 2018-01-01 LAB
ALBUMIN SERPL-MCNC: 3.8 G/DL (ref 3.4–5)
ALP SERPL-CCNC: 82 U/L (ref 40–150)
ALT SERPL W P-5'-P-CCNC: 22 U/L (ref 0–70)
ALT SERPL W P-5'-P-CCNC: <5 U/L (ref 5–30)
ANION GAP SERPL CALCULATED.3IONS-SCNC: 10 MMOL/L (ref 3–14)
ANION GAP SERPL CALCULATED.3IONS-SCNC: 10.7 MMOL/L (ref 6–17)
ANISOCYTOSIS BLD QL SMEAR: SLIGHT
AST SERPL W P-5'-P-CCNC: 23 U/L (ref 0–45)
BASOPHILS # BLD AUTO: 0 10E9/L (ref 0–0.2)
BASOPHILS NFR BLD AUTO: 0 %
BILIRUB SERPL-MCNC: 0.5 MG/DL (ref 0.2–1.3)
BUN SERPL-MCNC: 27 MG/DL (ref 7–30)
BUN SERPL-MCNC: 39 MG/DL (ref 7–30)
CALCIUM SERPL-MCNC: 8.7 MG/DL (ref 8.5–10.1)
CALCIUM SERPL-MCNC: 9.3 MG/DL (ref 8.5–10.5)
CHLORIDE SERPL-SCNC: 107 MMOL/L (ref 98–107)
CHLORIDE SERPL-SCNC: 109 MMOL/L (ref 94–109)
CHOLEST SERPL-MCNC: 128 MG/DL
CO2 SERPL-SCNC: 23 MMOL/L (ref 20–32)
CO2 SERPL-SCNC: 26 MMOL/L (ref 23–29)
CREAT SERPL-MCNC: 1.25 MG/DL (ref 0.66–1.25)
CREAT SERPL-MCNC: 1.45 MG/DL (ref 0.7–1.3)
DIFFERENTIAL METHOD BLD: ABNORMAL
EOSINOPHIL # BLD AUTO: 0 10E9/L (ref 0–0.7)
EOSINOPHIL NFR BLD AUTO: 0 %
ERYTHROCYTE [DISTWIDTH] IN BLOOD BY AUTOMATED COUNT: 16.1 % (ref 10–15)
GFR SERPL CREATININE-BSD FRML MDRD: 47 ML/MIN/1.7M2
GFR SERPL CREATININE-BSD FRML MDRD: 56 ML/MIN/1.7M2
GLUCOSE SERPL-MCNC: 100 MG/DL (ref 70–99)
GLUCOSE SERPL-MCNC: 105 MG/DL (ref 70–105)
HCT VFR BLD AUTO: 40.6 % (ref 40–53)
HDLC SERPL-MCNC: 43 MG/DL
HGB BLD-MCNC: 13 G/DL (ref 13.3–17.7)
LDH SERPL L TO P-CCNC: 302 U/L (ref 85–227)
LDLC SERPL CALC-MCNC: 54 MG/DL
LYMPHOCYTES # BLD AUTO: 1.8 10E9/L (ref 0.8–5.3)
LYMPHOCYTES NFR BLD AUTO: 3.4 %
MACROCYTES BLD QL SMEAR: PRESENT
MCH RBC QN AUTO: 34.8 PG (ref 26.5–33)
MCHC RBC AUTO-ENTMCNC: 32 G/DL (ref 31.5–36.5)
MCV RBC AUTO: 109 FL (ref 78–100)
METAMYELOCYTES # BLD: 1.8 10E9/L
METAMYELOCYTES NFR BLD MANUAL: 3.4 %
MONOCYTES # BLD AUTO: 1.3 10E9/L (ref 0–1.3)
MONOCYTES NFR BLD AUTO: 2.5 %
MYELOCYTES # BLD: 1.8 10E9/L
MYELOCYTES NFR BLD MANUAL: 3.4 %
NEUTROPHILS # BLD AUTO: 15.1 10E9/L (ref 1.6–8.3)
NEUTROPHILS NFR BLD AUTO: 29.3 %
NONHDLC SERPL-MCNC: 85 MG/DL
OTHER CELLS # BLD MANUAL: 30 10E9/L
OTHER CELLS NFR BLD MANUAL: 58 %
PLATELET # BLD AUTO: 82 10E9/L (ref 150–450)
PLATELET # BLD EST: ABNORMAL 10*3/UL
POTASSIUM SERPL-SCNC: 3.7 MMOL/L (ref 3.5–5.1)
POTASSIUM SERPL-SCNC: 3.9 MMOL/L (ref 3.4–5.3)
PROT SERPL-MCNC: 7.2 G/DL (ref 6.8–8.8)
RBC # BLD AUTO: 3.74 10E12/L (ref 4.4–5.9)
SODIUM SERPL-SCNC: 140 MMOL/L (ref 136–145)
SODIUM SERPL-SCNC: 142 MMOL/L (ref 133–144)
TRIGL SERPL-MCNC: 157 MG/DL
URATE SERPL-MCNC: 5.5 MG/DL (ref 3.5–7.2)
WBC # BLD AUTO: 51.7 10E9/L (ref 4–11)

## 2018-01-01 PROCEDURE — 84550 ASSAY OF BLOOD/URIC ACID: CPT | Performed by: INTERNAL MEDICINE

## 2018-01-01 PROCEDURE — 99213 OFFICE O/P EST LOW 20 MIN: CPT | Performed by: INTERNAL MEDICINE

## 2018-01-01 PROCEDURE — 36415 COLL VENOUS BLD VENIPUNCTURE: CPT | Performed by: INTERNAL MEDICINE

## 2018-01-01 PROCEDURE — 82247 BILIRUBIN TOTAL: CPT | Performed by: INTERNAL MEDICINE

## 2018-01-01 PROCEDURE — 80061 LIPID PANEL: CPT | Performed by: INTERNAL MEDICINE

## 2018-01-01 PROCEDURE — 80048 BASIC METABOLIC PNL TOTAL CA: CPT | Performed by: INTERNAL MEDICINE

## 2018-01-01 PROCEDURE — 83615 LACTATE (LD) (LDH) ENZYME: CPT | Performed by: INTERNAL MEDICINE

## 2018-01-01 PROCEDURE — 84460 ALANINE AMINO (ALT) (SGPT): CPT | Performed by: INTERNAL MEDICINE

## 2018-01-01 PROCEDURE — 84075 ASSAY ALKALINE PHOSPHATASE: CPT | Performed by: INTERNAL MEDICINE

## 2018-01-01 PROCEDURE — 80053 COMPREHEN METABOLIC PANEL: CPT | Performed by: INTERNAL MEDICINE

## 2018-01-01 PROCEDURE — 85025 COMPLETE CBC W/AUTO DIFF WBC: CPT | Performed by: INTERNAL MEDICINE

## 2018-01-01 PROCEDURE — 84100 ASSAY OF PHOSPHORUS: CPT | Performed by: INTERNAL MEDICINE

## 2018-01-01 PROCEDURE — 84450 TRANSFERASE (AST) (SGOT): CPT | Performed by: INTERNAL MEDICINE

## 2018-01-01 RX ORDER — SULFAMETHOXAZOLE/TRIMETHOPRIM 800-160 MG
1 TABLET ORAL
COMMUNITY
Start: 2018-01-01

## 2018-01-01 RX ORDER — NYSTATIN 100000/ML
500000 SUSPENSION, ORAL (FINAL DOSE FORM) ORAL 4 TIMES DAILY
COMMUNITY

## 2018-01-01 RX ORDER — VALSARTAN AND HYDROCHLOROTHIAZIDE 320; 12.5 MG/1; MG/1
1 TABLET, FILM COATED ORAL DAILY
COMMUNITY
End: 2018-01-01

## 2018-01-01 RX ORDER — PREDNISONE 20 MG/1
120 TABLET ORAL
COMMUNITY
Start: 2018-01-01 | End: 2019-01-01

## 2018-01-01 ASSESSMENT — MIFFLIN-ST. JEOR: SCORE: 1601.7

## 2018-03-30 ENCOUNTER — OFFICE VISIT (OUTPATIENT)
Dept: CARDIOLOGY | Facility: CLINIC | Age: 78
End: 2018-03-30
Attending: INTERNAL MEDICINE
Payer: COMMERCIAL

## 2018-03-30 VITALS
WEIGHT: 205.9 LBS | BODY MASS INDEX: 34.3 KG/M2 | HEART RATE: 68 BPM | DIASTOLIC BLOOD PRESSURE: 81 MMHG | HEIGHT: 65 IN | SYSTOLIC BLOOD PRESSURE: 123 MMHG

## 2018-03-30 DIAGNOSIS — Z95.1 S/P CABG (CORONARY ARTERY BYPASS GRAFT): ICD-10-CM

## 2018-03-30 DIAGNOSIS — I25.10 CORONARY ARTERY DISEASE INVOLVING NATIVE CORONARY ARTERY OF NATIVE HEART WITHOUT ANGINA PECTORIS: ICD-10-CM

## 2018-03-30 LAB
ALT SERPL W P-5'-P-CCNC: <5 U/L (ref 5–30)
ANION GAP SERPL CALCULATED.3IONS-SCNC: 12 MMOL/L (ref 6–17)
BUN SERPL-MCNC: 19 MG/DL (ref 7–30)
CALCIUM SERPL-MCNC: 9.5 MG/DL (ref 8.5–10.5)
CHLORIDE SERPL-SCNC: 105 MMOL/L (ref 98–107)
CHOLEST SERPL-MCNC: 96 MG/DL
CO2 SERPL-SCNC: 31 MMOL/L (ref 23–29)
CREAT SERPL-MCNC: 1.23 MG/DL (ref 0.7–1.3)
GFR SERPL CREATININE-BSD FRML MDRD: 57 ML/MIN/1.7M2
GLUCOSE SERPL-MCNC: 106 MG/DL (ref 70–105)
HDLC SERPL-MCNC: 33 MG/DL
LDLC SERPL CALC-MCNC: 42 MG/DL
NONHDLC SERPL-MCNC: 63 MG/DL
POTASSIUM SERPL-SCNC: 4 MMOL/L (ref 3.5–5.1)
SODIUM SERPL-SCNC: 144 MMOL/L (ref 136–145)
TRIGL SERPL-MCNC: 104 MG/DL

## 2018-03-30 PROCEDURE — 80061 LIPID PANEL: CPT | Performed by: INTERNAL MEDICINE

## 2018-03-30 PROCEDURE — 80048 BASIC METABOLIC PNL TOTAL CA: CPT | Performed by: INTERNAL MEDICINE

## 2018-03-30 PROCEDURE — 99213 OFFICE O/P EST LOW 20 MIN: CPT | Performed by: INTERNAL MEDICINE

## 2018-03-30 PROCEDURE — 84460 ALANINE AMINO (ALT) (SGPT): CPT | Performed by: INTERNAL MEDICINE

## 2018-03-30 PROCEDURE — 36415 COLL VENOUS BLD VENIPUNCTURE: CPT | Performed by: INTERNAL MEDICINE

## 2018-03-30 NOTE — PROGRESS NOTES
Service Date: 03/30/2018      REASON FOR CLINIC VISIT:  Follow up AVR, coronary artery disease.      HISTORY OF PRESENT ILLNESS:  Mr. Arango is a very pleasant 77-year-old gentleman with history of bicuspid aortic valve plus severe aortic stenosis along with 1-vessel CABG in 04/2013 (St. Azam Trifecta valve 23 mm and SVG to RPDA).  He has other comorbidities of recurrent stroke and was followed by Neurology and had a Reveal implantable loop recorder at [facility] and was found to have brief episode of paroxysmal atrial fibrillation.  The Reveal device was explanted in 12/2016.  He was started on apixaban.  Last summer, he had GI bleed with melena and was found to have gastric ulcer and duodenal ulcer and underwent treatment for same.  He was started on PPI and eventually started back on apixaban, and aspirin was discontinued.  Since that time, he has done quite well.  No recurrence of melena or GI bleed.  He had normal stress testing in 10/2016.  There was mild aortic dilatation.      Today, he is coming for routine followup.  Overall, he tells me that he is doing well.  No chest discomfort or shortness of breath at rest.  No chest discomfort with exertion.  He has lost 4 pounds of weight since we last met.  He is looking forward to 60th high school reunion over the Fourth of July and also participating in a golf tournament.  Lab studies show well controlled LDL.  Kidney functions are normal.        CURRENT MEDICATIONS:  He is on valsartan-hydrochlorothiazide, simvastatin 40 mg daily, and apixaban.      PHYSICAL EXAMINATION:   VITAL SIGNS:  Blood pressure 123/81, heart rate 68 and regular, weight 205 pounds, BMI 34.34.   GENERAL:  The patient appears pleasant, comfortable.   NECK:  Normal JVP, no bruit.   CARDIOVASCULAR SYSTEM:  S1, S2 normal.  No murmur, rub or gallop.   RESPIRATORY SYSTEM:  Clear to auscultation bilaterally.   ABDOMEN:  Soft, nontender.   EXTREMITIES:  No pitting pedal edema.    NEUROLOGICAL:  Alert, oriented x3.   PSYCHIATRIC:  Normal affect.     SKIN:  No obvious skin lesions.    HEENT:  No pallor or icterus.      IMPRESSION AND PLAN:  A pleasant 77-year-old with history of CABG status post CABG, status post bioprosthetic AVR, paroxysmal atrial fibrillation with CHADS2-VASc score of 6, on apixaban for stroke prophylaxis with history of GI bleed, found to have peptic ulcer disease, underwent treatment, back on apixaban for the last several months without any recurrence.  Overall, cardiac status-wise he is doing quite well without any symptoms of angina or congestive heart failure.  Blood pressure and LDL are both well controlled.  Cardiac auscultation was benign today.  At this time, I recommend continuing current cardiac medication and if he continues to feel well, we can see him back in 1 year with a repeat echocardiogram to follow up on mildly dilated aorta, sooner if patient notices any change in clinical status, especially if any exertion-related symptoms.         ELMA SOLER MD             D: 2018   T: 2018   MT: MARTIN      Name:     ADAM MULLER   MRN:      [id number]        Account:      [id number]   :      1940           Service Date: 2018      Document: [id number]

## 2018-03-30 NOTE — LETTER
3/30/2018    Ashutosh Melgar MD  [facility] [address] [facility] [address] [place] [address]    RE: Anshul Arango       Dear Colleague,    I had the pleasure of seeing Anshul Arango in the [facility].    HPI and Plan:   See dictation(#[id number])    Orders Placed This Encounter   Procedures     Basic metabolic panel     Lipid Profile     ALT     Follow-Up with Cardiologist     Echocardiogram       No orders of the defined types were placed in this encounter.      There are no discontinued medications.      Encounter Diagnoses   Name Primary?     S/P CABG (coronary artery bypass graft)      Coronary artery disease involving native coronary artery of native heart without angina pectoris        CURRENT MEDICATIONS:  Current Outpatient Prescriptions   Medication Sig Dispense Refill     valsartan (DIOVAN) 320 MG tablet Take 1 tablet (320 mg) by mouth daily 90 tablet 2     sildenafil (VIAGRA) 100 MG cap/tab Take 1 tablet (100 mg) by mouth daily as needed for erectile dysfunction 30 tablet      apixaban ANTICOAGULANT (ELIQUIS) 5 MG tablet Take 1 tablet (5 mg) by mouth 2 times daily       pantoprazole (PROTONIX) 40 MG EC tablet Take 1 tablet (40 mg) by mouth 2 times daily (Patient taking differently: Take 40 mg by mouth daily ) 90 tablet 1     loperamide (IMODIUM A-D) 2 MG tablet Take 2 mg by mouth 2 times daily as needed        hydrochlorothiazide (MICROZIDE) 12.5 MG capsule Take 25 mg by mouth every morning        metoprolol (TOPROL-XL) 100 MG 24 hr tablet Take 100 mg by mouth daily       acetaminophen 500 MG CAPS Take 500 mg by mouth daily as needed        simvastatin (ZOCOR) 40 MG tablet Take 40 mg by mouth At Bedtime        ALLOPURINOL PO Take 300 mg by mouth daily. Takes in AM       AMOXICILLIN PO Take 2,000 mg by mouth See Admin Instructions As directed before dental work       Cholecalciferol (VITAMIN D3 PO) Take 2,000 Units by mouth daily          ALLERGIES      Allergies   Allergen Reactions     Codeine Sulfate      VERY LIGHTHEADED     Caffeine      Much dizziness & anxiety       PAST MEDICAL HISTORY:  Past Medical History:   Diagnosis Date     Acute anterior wall MI (H)      Anemia     iron deficiency     Aortic stenosis     bioprosthetic AVR 4/2013     Arthritis      CAD (coronary artery disease)     s/p CABG (SVG to RPD) 4/2013     Cardiac arrest - ventricular fibrillation      CVA (cerebral vascular accident) (H)     2013, 2014     Gout      Hyperlipaemia      Hypertension      Inguinal hernia without mention of obstruction or gangrene, unilateral or unspecified, (not specified as recurrent)      Laryngeal cancer (H)      Osteoarthrosis      Paroxysmal atrial fibrillation (H)     20 seconds detected on Reveal     Pericardial effusion     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     Sepsis (H)      Sleep apnea     uses CPAP     Upper GI bleed     11/08       PAST SURGICAL HISTORY:  Past Surgical History:   Procedure Laterality Date     APPENDECTOMY       bilateral tka       BYPASS GRAFT ARTERY CORONARY, REPLACE VALVE AORTIC, COMBINED  4/9/2013    Procedure: COMBINED BYPASS GRAFT ARTERY CORONARY, REPLACE VALVE AORTIC;  BYPASS GRAFT ARTERY CORONARYX 1 (VEIN GRAFT TO PDA), REPLACE VALVE AORTIC (TRIFECTA 23MM), ENDOSCOPIC VEIN HARVEST(ON PUMP);  Surgeon: Lenny Rowland MD;  Location:  OR     CARDIAC SURGERY  4/2013    AVR for severe aortic stenosis with 1 vessel CABG (SVG to RPDA, 23 mm ST.Azam Trifecta tissue valve)     ENT SURGERY      t & a     ESOPHAGOSCOPY, GASTROSCOPY, DUODENOSCOPY (EGD), COMBINED N/A 6/26/2017    Procedure: COMBINED ESOPHAGOSCOPY, GASTROSCOPY, DUODENOSCOPY (EGD), BIOPSY SINGLE OR MULTIPLE;  gastroscopy;  Surgeon: Jorje Craft MD;  Location:  GI     HC LOOP RECORDER IMPLANT  2014    [facility]     HERNIA REPAIR       INCISION AND DRAINAGE STERNUM W/ IRRIGATION SYSTEM, COMBINED  5/30/2013    Procedure: COMBINED  "INCISION AND DRAINAGE STERNUM W/ IRRIGATION SYSTEM;  INCISION AND DRAINAGE OF STERNAL WOUND;  Surgeon: Lenny Rowland MD;  Location: [facility] OR     ORTHOPEDIC SURGERY      anjel tka        FAMILY HISTORY:  Family History   Problem Relation Age of Onset     Chronic Obstructive Pulmonary Disease Mother      Alzheimer Disease Father      Myocardial Infarction Paternal Grandfather      Myocardial Infarction Brother        SOCIAL HISTORY:  Social History     Social History     Marital status:      Spouse name: N/A     Number of children: N/A     Years of education: N/A     Social History Main Topics     Smoking status: Former Smoker     Packs/day: 1.00     Years: 40.00     Types: Cigarettes, Cigars     Start date: 1960     Quit date: 4/9/2000     Smokeless tobacco: Never Used     Alcohol use Yes      Comment: one drink/day     Drug use: No     Sexual activity: Not Asked     Other Topics Concern     Parent/Sibling W/ Cabg, Mi Or Angioplasty Before 65f 55m? No     Caffeine Concern Yes     drinks de-caf     Sleep Concern Yes     sleep apnea, wears cpap at night     Stress Concern No     Weight Concern No     Special Diet No     Exercise No     nothing regular      Social History Narrative       Review of Systems:  Skin:  Negative       Eyes:  Positive for contacts    ENT:  Negative      Respiratory:  Positive for dyspnea on exertion;sleep apnea;CPAP     Cardiovascular:  Negative      Gastroenterology: Negative      Genitourinary:         Musculoskeletal:  Positive for neck pain;back pain chronic  Neurologic:  Positive for numbness or tingling of feet    Psychiatric:  Negative      Heme/Lymph/Imm:  Positive for allergies seasonal  Endocrine:  Negative        Physical Exam:  Vitals: /81  Pulse 68  Ht 1.651 m (5' 5\")  Wt 93.4 kg (205 lb 14.4 oz)  BMI 34.26 kg/m2    Constitutional:           Skin:             Head:           Eyes:           Lymph:      ENT:           Neck:           Respiratory:            Cardiac:     "                                                       GI:           Extremities and Muscular Skeletal:                 Neurological:           Psych:             CC  Pierce Fountain MD  [address]  [place] [address]                    Thank you for allowing me to participate in the care of your patient.      Sincerely,     Pierce Fountain MD     [facility]    cc:   Pierce Fountain MD  [address]  [place] [address]

## 2018-03-30 NOTE — LETTER
3/30/2018      Ashutosh Melgar MD  [facility] [address] [place] [address]      RE: Anshul Arango       Dear Colleague,    I had the pleasure of seeing Anshul Arango in the [facility].    Service Date: 03/30/2018      REASON FOR CLINIC VISIT:  Follow up AVR, coronary artery disease.      HISTORY OF PRESENT ILLNESS:  Mr. Arango is a very pleasant 77-year-old gentleman with history of bicuspid aortic valve plus severe aortic stenosis along with 1-vessel CABG in 04/2013 (St. Azam Trifecta valve 23 mm and SVG to RPDA).  He has other comorbidities of recurrent stroke and was followed by Neurology and had a Reveal implantable loop recorder at [facility] and was found to have brief episode of paroxysmal atrial fibrillation.  The Reveal device was explanted in 12/2016.  He was started on apixaban.  Last summer, he had GI bleed with melena and was found to have gastric ulcer and duodenal ulcer and underwent treatment for same.  He was started on PPI and eventually started back on apixaban, and aspirin was discontinued.  Since that time, he has done quite well.  No recurrence of melena or GI bleed.  He had normal stress testing in 10/2016.  There was mild aortic dilatation.      Today, he is coming for routine followup.  Overall, he tells me that he is doing well.  No chest discomfort or shortness of breath at rest.  No chest discomfort with exertion.  He has lost 4 pounds of weight since we last met.  He is looking forward to 60th high school reunion over the Fourth of July and also participating in a golf tournament.  Lab studies show well controlled LDL.  Kidney functions are normal.        CURRENT MEDICATIONS:  He is on valsartan-hydrochlorothiazide, simvastatin 40 mg daily, and apixaban.      PHYSICAL EXAMINATION:   VITAL SIGNS:  Blood pressure 123/81, heart rate 68 and regular, weight 205 pounds, BMI 34.34.   GENERAL:  The patient  appears pleasant, comfortable.   NECK:  Normal JVP, no bruit.   CARDIOVASCULAR SYSTEM:  S1, S2 normal.  No murmur, rub or gallop.   RESPIRATORY SYSTEM:  Clear to auscultation bilaterally.   ABDOMEN:  Soft, nontender.   EXTREMITIES:  No pitting pedal edema.   NEUROLOGICAL:  Alert, oriented x3.   PSYCHIATRIC:  Normal affect.     SKIN:  No obvious skin lesions.    HEENT:  No pallor or icterus.      IMPRESSION AND PLAN:  A pleasant 77-year-old with history of CABG status post CABG, status post bioprosthetic AVR, paroxysmal atrial fibrillation with CHADS2-VASc score of 6, on apixaban for stroke prophylaxis with history of GI bleed, found to have peptic ulcer disease, underwent treatment, back on apixaban for the last several months without any recurrence.  Overall, cardiac status-wise he is doing quite well without any symptoms of angina or congestive heart failure.  Blood pressure and LDL are both well controlled.  Cardiac auscultation was benign today.  At this time, I recommend continuing current cardiac medication and if he continues to feel well, we can see him back in 1 year with a repeat echocardiogram to follow up on mildly dilated aorta, sooner if patient notices any change in clinical status, especially if any exertion-related symptoms.         ELMA SOLER MD             D: 2018   T: 2018   MT: MARTIN      Name:     ADAM MULLER   MRN:      [id number]        Account:      [id number]   :      1940           Service Date: 2018      Document: [id number]         Outpatient Encounter Prescriptions as of 3/30/2018   Medication Sig Dispense Refill     valsartan (DIOVAN) 320 MG tablet Take 1 tablet (320 mg) by mouth daily 90 tablet 2     sildenafil (VIAGRA) 100 MG cap/tab Take 1 tablet (100 mg) by mouth daily as needed for erectile dysfunction 30 tablet      apixaban ANTICOAGULANT (ELIQUIS) 5 MG tablet Take 1 tablet (5 mg) by mouth 2 times daily       pantoprazole (PROTONIX) 40 MG EC  tablet Take 1 tablet (40 mg) by mouth 2 times daily (Patient taking differently: Take 40 mg by mouth daily ) 90 tablet 1     loperamide (IMODIUM A-D) 2 MG tablet Take 2 mg by mouth 2 times daily as needed        hydrochlorothiazide (MICROZIDE) 12.5 MG capsule Take 25 mg by mouth every morning        metoprolol (TOPROL-XL) 100 MG 24 hr tablet Take 100 mg by mouth daily       acetaminophen 500 MG CAPS Take 500 mg by mouth daily as needed        simvastatin (ZOCOR) 40 MG tablet Take 40 mg by mouth At Bedtime        ALLOPURINOL PO Take 300 mg by mouth daily. Takes in AM       AMOXICILLIN PO Take 2,000 mg by mouth See Admin Instructions As directed before dental work       Cholecalciferol (VITAMIN D3 PO) Take 2,000 Units by mouth daily        No facility-administered encounter medications on file as of 3/30/2018.        Again, thank you for allowing me to participate in the care of your patient.      Sincerely,    Pierce Fountain MD     [facility]

## 2018-03-30 NOTE — PROGRESS NOTES
HPI and Plan:   See dictation(#[id number])    Orders Placed This Encounter   Procedures     Basic metabolic panel     Lipid Profile     ALT     Follow-Up with Cardiologist     Echocardiogram       No orders of the defined types were placed in this encounter.      There are no discontinued medications.      Encounter Diagnoses   Name Primary?     S/P CABG (coronary artery bypass graft)      Coronary artery disease involving native coronary artery of native heart without angina pectoris        CURRENT MEDICATIONS:  Current Outpatient Prescriptions   Medication Sig Dispense Refill     valsartan (DIOVAN) 320 MG tablet Take 1 tablet (320 mg) by mouth daily 90 tablet 2     sildenafil (VIAGRA) 100 MG cap/tab Take 1 tablet (100 mg) by mouth daily as needed for erectile dysfunction 30 tablet      apixaban ANTICOAGULANT (ELIQUIS) 5 MG tablet Take 1 tablet (5 mg) by mouth 2 times daily       pantoprazole (PROTONIX) 40 MG EC tablet Take 1 tablet (40 mg) by mouth 2 times daily (Patient taking differently: Take 40 mg by mouth daily ) 90 tablet 1     loperamide (IMODIUM A-D) 2 MG tablet Take 2 mg by mouth 2 times daily as needed        hydrochlorothiazide (MICROZIDE) 12.5 MG capsule Take 25 mg by mouth every morning        metoprolol (TOPROL-XL) 100 MG 24 hr tablet Take 100 mg by mouth daily       acetaminophen 500 MG CAPS Take 500 mg by mouth daily as needed        simvastatin (ZOCOR) 40 MG tablet Take 40 mg by mouth At Bedtime        ALLOPURINOL PO Take 300 mg by mouth daily. Takes in AM       AMOXICILLIN PO Take 2,000 mg by mouth See Admin Instructions As directed before dental work       Cholecalciferol (VITAMIN D3 PO) Take 2,000 Units by mouth daily          ALLERGIES     Allergies   Allergen Reactions     Codeine Sulfate      VERY LIGHTHEADED     Caffeine      Much dizziness & anxiety       PAST MEDICAL HISTORY:  Past Medical History:   Diagnosis Date     Acute anterior wall MI (H)      Anemia     iron deficiency     Aortic  stenosis     bioprosthetic AVR 4/2013     Arthritis      CAD (coronary artery disease)     s/p CABG (SVG to RPD) 4/2013     Cardiac arrest - ventricular fibrillation      CVA (cerebral vascular accident) (H)     2013, 2014     Gout      Hyperlipaemia      Hypertension      Inguinal hernia without mention of obstruction or gangrene, unilateral or unspecified, (not specified as recurrent)      Laryngeal cancer (H)      Osteoarthrosis      Paroxysmal atrial fibrillation (H)     20 seconds detected on Reveal     Pericardial effusion     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     Sepsis (H)      Sleep apnea     uses CPAP     Upper GI bleed     11/08       PAST SURGICAL HISTORY:  Past Surgical History:   Procedure Laterality Date     APPENDECTOMY       bilateral tka       BYPASS GRAFT ARTERY CORONARY, REPLACE VALVE AORTIC, COMBINED  4/9/2013    Procedure: COMBINED BYPASS GRAFT ARTERY CORONARY, REPLACE VALVE AORTIC;  BYPASS GRAFT ARTERY CORONARYX 1 (VEIN GRAFT TO PDA), REPLACE VALVE AORTIC (TRIFECTA 23MM), ENDOSCOPIC VEIN HARVEST(ON PUMP);  Surgeon: Lenny Rowland MD;  Location:  OR     CARDIAC SURGERY  4/2013    AVR for severe aortic stenosis with 1 vessel CABG (SVG to RPDA, 23 mm ST.Azam Trifecta tissue valve)     ENT SURGERY      t & a     ESOPHAGOSCOPY, GASTROSCOPY, DUODENOSCOPY (EGD), COMBINED N/A 6/26/2017    Procedure: COMBINED ESOPHAGOSCOPY, GASTROSCOPY, DUODENOSCOPY (EGD), BIOPSY SINGLE OR MULTIPLE;  gastroscopy;  Surgeon: Jorje Craft MD;  Location:  GI     HC LOOP RECORDER IMPLANT  2014    [facility]     HERNIA REPAIR       INCISION AND DRAINAGE STERNUM W/ IRRIGATION SYSTEM, COMBINED  5/30/2013    Procedure: COMBINED INCISION AND DRAINAGE STERNUM W/ IRRIGATION SYSTEM;  INCISION AND DRAINAGE OF STERNAL WOUND;  Surgeon: Lenny Rowland MD;  Location:  OR     ORTHOPEDIC SURGERY      anjel tka        FAMILY HISTORY:  Family History   Problem Relation Age of Onset     Chronic  "Obstructive Pulmonary Disease Mother      Alzheimer Disease Father      Myocardial Infarction Paternal Grandfather      Myocardial Infarction Brother        SOCIAL HISTORY:  Social History     Social History     Marital status:      Spouse name: N/A     Number of children: N/A     Years of education: N/A     Social History Main Topics     Smoking status: Former Smoker     Packs/day: 1.00     Years: 40.00     Types: Cigarettes, Cigars     Start date: 1960     Quit date: 4/9/2000     Smokeless tobacco: Never Used     Alcohol use Yes      Comment: one drink/day     Drug use: No     Sexual activity: Not Asked     Other Topics Concern     Parent/Sibling W/ Cabg, Mi Or Angioplasty Before 65f 55m? No     Caffeine Concern Yes     drinks de-caf     Sleep Concern Yes     sleep apnea, wears cpap at night     Stress Concern No     Weight Concern No     Special Diet No     Exercise No     nothing regular      Social History Narrative       Review of Systems:  Skin:  Negative       Eyes:  Positive for contacts    ENT:  Negative      Respiratory:  Positive for dyspnea on exertion;sleep apnea;CPAP     Cardiovascular:  Negative      Gastroenterology: Negative      Genitourinary:         Musculoskeletal:  Positive for neck pain;back pain chronic  Neurologic:  Positive for numbness or tingling of feet    Psychiatric:  Negative      Heme/Lymph/Imm:  Positive for allergies seasonal  Endocrine:  Negative        Physical Exam:  Vitals: /81  Pulse 68  Ht 1.651 m (5' 5\")  Wt 93.4 kg (205 lb 14.4 oz)  BMI 34.26 kg/m2    Constitutional:           Skin:             Head:           Eyes:           Lymph:      ENT:           Neck:           Respiratory:            Cardiac:                                                           GI:           Extremities and Muscular Skeletal:                 Neurological:           Psych:             CC  Pierce Fountain MD  [address]  [place] [address]                  "

## 2018-03-30 NOTE — MR AVS SNAPSHOT
After Visit Summary   3/30/2018    Anshul Arango    MRN: [id number]           Patient Information     Date Of Birth          1940        Visit Information        Provider Department      3/30/2018 10:15 AM Pierce Fountain MD [facility]        Today's Diagnoses     S/P CABG (coronary artery bypass graft)        Coronary artery disease involving native coronary artery of native heart without angina pectoris           Follow-ups after your visit        Additional Services     Follow-Up with Cardiologist                 Future tests that were ordered for you today     Open Future Orders        Priority Expected Expires Ordered    Follow-Up with Cardiologist Routine 3/30/2019 3/31/2019 3/30/2018    Echocardiogram Routine 3/30/2019 3/31/2019 3/30/2018    Basic metabolic panel Routine 3/30/2019 3/31/2019 3/30/2018    Lipid Profile Routine 3/30/2019 3/30/2019 3/30/2018    ALT Routine 3/30/2019 3/30/2019 3/30/2018            Who to contact     If you have questions or need follow up information about today's clinic visit or your schedule please contact [facility] directly at [phone].  Normal or non-critical lab and imaging results will be communicated to you by MyChart, letter or phone within 4 business days after the clinic has received the results. If you do not hear from us within 7 days, please contact the clinic through [employer] or phone. If you have a critical or abnormal lab result, we will notify you by phone as soon as possible.  Submit refill requests through [employer] or call your pharmacy and they will forward the refill request to us. Please allow 3 business days for your refill to be completed.          Additional Information About Your Visit        MyChart Information     [employer] lets you send messages to your doctor, view your test results, renew your prescriptions, schedule appointments and more. To sign up,  "go to [email or web address] . Click on \"Log in\" on the left side of the screen, which will take you to the Welcome page. Then click on \"Sign up Now\" on the right side of the page.     You will be asked to enter the access code listed below, as well as some personal information. Please follow the directions to create your username and password.     Your access code is: [email or web address]  Expires: 2018 10:41 AM     Your access code will  in 90 days. If you need help or a new code, please call your [place] clinic or [phone].        Care EveryWhere ID     This is your Care EveryWhere ID. This could be used by other organizations to access your [place] medical records  [id number]        Your Vitals Were     Pulse Height BMI (Body Mass Index)             68 1.651 m (5' 5\") 34.26 kg/m2          Blood Pressure from Last 3 Encounters:   18 123/81   17 138/70   17 132/71    Weight from Last 3 Encounters:   18 93.4 kg (205 lb 14.4 oz)   17 95 kg (209 lb 6.4 oz)   17 91.2 kg (201 lb)              We Performed the Following     Follow-Up with Cardiologist          Today's Medication Changes          These changes are accurate as of 3/30/18 10:41 AM.  If you have any questions, ask your nurse or doctor.               These medicines have changed or have updated prescriptions.        Dose/Directions    pantoprazole 40 MG EC tablet   Commonly known as:  PROTONIX   This may have changed:  when to take this   Used for:  Upper GI bleed        Dose:  40 mg   Take 1 tablet (40 mg) by mouth 2 times daily   Quantity:  90 tablet   Refills:  1                Primary Care Provider Office Phone # Fax #    Ashutosh Melgar [phone]       [facility] [address] [place] [address]        Equal Access to Services     RITA PAULSON AH: Geri Sanchez, rebeca carmona, bijan hernándezarash " la'migueln rosamaria. So [facility] [phone].    ATENCIÓN: Si habla zenia, tiene a griffin disposición servicios gratuitos de asistencia lingüística. Rossi mendez [phone].    We comply with applicable federal civil rights laws and [place] laws. We do not discriminate on the basis of race, color, national origin, age, disability, sex, sexual orientation, or gender identity.            Thank you!     Thank you for choosing [facility]  for your care. Our goal is always to provide you with excellent care. Hearing back from our patients is one way we can continue to improve our services. Please take a few minutes to complete the written survey that you may receive in the mail after your visit with us. Thank you!             Your Updated Medication List - Protect others around you: Learn how to safely use, store and throw away your medicines at www.disposemymeds.org.          This list is accurate as of 3/30/18 10:41 AM.  Always use your most recent med list.                   Brand Name Dispense Instructions for use Diagnosis    acetaminophen 500 MG Caps      Take 500 mg by mouth daily as needed        ALLOPURINOL PO      Take 300 mg by mouth daily. Takes in AM        AMOXICILLIN PO      Take 2,000 mg by mouth See Admin Instructions As directed before dental work        apixaban ANTICOAGULANT 5 MG tablet    ELIQUIS     Take 1 tablet (5 mg) by mouth 2 times daily    PAF (paroxysmal atrial fibrillation) (H)       hydrochlorothiazide 12.5 MG capsule    MICROZIDE     Take 25 mg by mouth every morning        loperamide 2 MG tablet    IMODIUM A-D     Take 2 mg by mouth 2 times daily as needed        metoprolol succinate 100 MG 24 hr tablet    TOPROL-XL     Take 100 mg by mouth daily        pantoprazole 40 MG EC tablet    PROTONIX    90 tablet    Take 1 tablet (40 mg) by mouth 2 times daily    Upper GI bleed       simvastatin 40 MG tablet    ZOCOR     Take 40 mg by mouth At Bedtime        valsartan 320  MG tablet    DIOVAN    90 tablet    Take 1 tablet (320 mg) by mouth daily    Essential hypertension       VIAGRA 100 MG tablet   Generic drug:  sildenafil     30 tablet    Take 1 tablet (100 mg) by mouth daily as needed for erectile dysfunction        VITAMIN D3 PO      Take 2,000 Units by mouth daily

## 2018-05-25 DIAGNOSIS — I10 ESSENTIAL HYPERTENSION: ICD-10-CM

## 2018-05-25 RX ORDER — VALSARTAN 320 MG/1
320 TABLET ORAL DAILY
Qty: 90 TABLET | Refills: 3 | Status: SHIPPED | OUTPATIENT
Start: 2018-05-25 | End: 2018-08-23 | Stop reason: ALTCHOICE

## 2018-08-23 ENCOUNTER — TELEPHONE (OUTPATIENT)
Dept: CARDIOLOGY | Facility: CLINIC | Age: 78
End: 2018-08-23

## 2018-08-23 DIAGNOSIS — I10 BENIGN ESSENTIAL HYPERTENSION: Primary | ICD-10-CM

## 2018-08-23 RX ORDER — IRBESARTAN 300 MG/1
300 TABLET ORAL DAILY
Qty: 90 TABLET | Refills: 3 | Status: SHIPPED | OUTPATIENT
Start: 2018-08-23

## 2018-08-23 NOTE — TELEPHONE ENCOUNTER
New script sent for Irbesartan 300 mg daily to [facility].  Call placed to update patient. Voice message to call back to discuss.

## 2018-08-23 NOTE — TELEPHONE ENCOUNTER
[facility] requesting alternative to Valsartan 320 mg tablets taken daily.    Will message Dr. Fountain for his recommendations and then will send in a refill.

## 2018-08-27 NOTE — TELEPHONE ENCOUNTER
Patient called back and states he did receive a call from [facility] and his Valsartan was not one of the manufactureres that was recalled.  He has approx a month  And 1/2 left of his valsartan then will change to Avarpo.  Patient also requested a reminder letter of his upcoming OV.

## 2018-11-28 NOTE — TELEPHONE ENCOUNTER
ONCOLOGY INTAKE: Records Information      APPT INFORMATION:  Referring provider:  Dr Jc Eng  Referring provider s clinic:  N  Reason for visit/diagnosis:  Bladder Cancer    Were the records received with the referral (via [employer])? yes    NOTE:  PT is unaware of this additional referral and only knew about the current one scheduled with Dr. Sanchez for ALL.  I didn't go into detail and let him know that I would check to see if he needed two consults or if the one with Dr. Sanchez was sufficient.  Instructed PT not to change anything on his schedule unless he hears back from us.    Call to [facility] to confirm that PT is aware of Dx and if he needs a second consult.  Spoke with Dr Egn's nurse, Patience and she said this is just an update noting that he also has bladder cancer.  No additional appt needed.

## 2018-12-04 NOTE — TELEPHONE ENCOUNTER
RECORDS STATUS - ALL OTHER DIAGNOSIS      RECORDS RECEIVED FROM: CE   DATE RECEIVED: 12/04/18   NOTES STATUS DETAILS   OFFICE NOTE from referring provider Complete CE   OFFICE NOTE from medical oncologist     DISCHARGE SUMMARY from hospital     DISCHARGE REPORT from the ER     OPERATIVE REPORT Complete CE   MEDICATION LIST     CLINICAL TRIAL TREATMENTS TO DATE     LABS     PATHOLOGY REPORTS     ANYTHING RELATED TO DIAGNOSIS     GENONOMIC TESTING     TYPE:     IMAGING (NEED IMAGES & REPORT)     CT SCANS Requested [facility]   MRI     MAMMO     ULTRASOUND     PET

## 2018-12-05 NOTE — TELEPHONE ENCOUNTER
Amber from [facility] called to inform us they only have one image of the chest Ab pelvis.  Noted other imaging was taken at [facility].

## 2018-12-06 NOTE — LETTER
12/6/2018       RE: Anshul Arango  [address] [place] [address]     Dear Colleague,    Thank you for referring your patient, Anshul Arango, to the [facility] at [facility]. Please see a copy of my visit note below.    [facility] Consult  12/6/2018      Jc:    Thank you for referring Mr. Arango to the [facility] at the [facility]. As you know his history well I will review it now for our records.    As you know he is a 77 yo man with a very complicated past medical history that includes cardiac disease, remote throat cancer, COPD, aortic valve replacement, CABG, etc. who has had a mildly elevated white count going back a couple of years and elevated hemoglobin.  In June 2016 he had a peripheral blood smear that showed macrocytic anemia with mild leukocytosis and no signs of dysplasia.  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  Cytogenetics were normal, BCR able fish was negative.  The flow cytometry of the blasts show that they were CD34 positive, CD19 positive, CD79a positive,  positive and negative for 13 and 33.  Also during his workup of leukocytosis he had a CT of the chest abdomen and pelvis on 11/17/2018 that showed splenomegaly and a right adrenal lesion as well as a bladder mass.  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    Given the finding of the new urothelial cancer as well as some other issues that need to be taking care of with a crown placement therapy has been held until this visit today to discuss  possible options.  He and his significant other and 2 adult children to accompany him today to discuss next steps.    Adam notes some increased fatigue, some mild increase in naps, some bruising, some lower appetite, 4 pound weight loss.  He notes no bone pain no night sweats no fevers or chills.  He does note that he had a recent cellulitis behind the knee and has had a prolonged cough upper respiratory infection that finally resolved.  He comes in today to discuss next steps.    Past medical history:  1.  Acute lumbar blastic leukemia see HPI  2.  Throat cancer status post resection and radiation and approximate   3.  COPD  4.  Gout  5.  Recently diagnosed low-grade papillary urothelial cancer  6.  Hypertension  7.  Hyperlipidemia  8.  Aortic valve replacement back in   9.  CABG x1 in 2013  10.  Iron deficiency anemia  11.  Hernia repair in the   12.  Status post appendectomy  13.  History of cardiac stents  14.  Tonsillectomy  15.  Obstructive sleep apnea  16.  History of left MCA stroke in 2013  16.  Osteoarthritis  17.  Herniated disc    Meds: Zocor, Norvasc, amoxicillin predental work, hydrochlorothiazide, allopurinol, Diovan, Toprol-XL, Protonix, Eliquis which is currently on hold post urology procedure    Social history: No tobacco or alcohol.  His wife of many years  in  of fallopian tube carcinoma.  He has 2 adult children who would accompany him today.  He has a significant other that he is very social with.  He  worked [employer] for 36 years and retired in .    10 point review of systems is otherwise negative    Physical Exam:  There were no vitals taken for this visit.  Gen: And appears younger than his stated age HEENT: No icterus or injection  and well appearing  Extremities: Trace edema  Normal gait  Full exam not done is all is a 60-minute visit spent discussing next steps    Labs:  Results for ADAM MULLER (MRN [id number]) as of 2018 16:43   Ref. Range  12/6/2018 09:16   Sodium Latest Ref Range: 133 - 144 mmol/L 142   Potassium Latest Ref Range: 3.4 - 5.3 mmol/L 3.9   Chloride Latest Ref Range: 94 - 109 mmol/L 109   Carbon Dioxide Latest Ref Range: 20 - 32 mmol/L 23   Urea Nitrogen Latest Ref Range: 7 - 30 mg/dL 27   Creatinine Latest Ref Range: 0.66 - 1.25 mg/dL 1.25   GFR Estimate Latest Ref Range: >60 mL/min/1.7m2 56 (L)   GFR Estimate If Black Latest Ref Range: >60 mL/min/1.7m2 68   Calcium Latest Ref Range: 8.5 - 10.1 mg/dL 8.7   Anion Gap Latest Ref Range: 3 - 14 mmol/L 10   Albumin Latest Ref Range: 3.4 - 5.0 g/dL 3.8   Protein Total Latest Ref Range: 6.8 - 8.8 g/dL 7.2   Bilirubin Total Latest Ref Range: 0.2 - 1.3 mg/dL 0.5   Alkaline Phosphatase Latest Ref Range: 40 - 150 U/L 82   ALT Latest Ref Range: 0 - 70 U/L 22   AST Latest Ref Range: 0 - 45 U/L 23   Lactate Dehydrogenase Latest Ref Range: 85 - 227 U/L 302 (H)   Uric Acid Latest Ref Range: 3.5 - 7.2 mg/dL 5.5   Glucose Latest Ref Range: 70 - 99 mg/dL 100 (H)   WBC Latest Ref Range: 4.0 - 11.0 10e9/L 51.7 (HH)   Hemoglobin Latest Ref Range: 13.3 - 17.7 g/dL 13.0 (L)   Hematocrit Latest Ref Range: 40.0 - 53.0 % 40.6   Platelet Count Latest Ref Range: 150 - 450 10e9/L 82 (L)   RBC Count Latest Ref Range: 4.4 - 5.9 10e12/L 3.74 (L)   MCV Latest Ref Range: 78 - 100 fl 109 (H)   MCH Latest Ref Range: 26.5 - 33.0 pg 34.8 (H)   MCHC Latest Ref Range: 31.5 - 36.5 g/dL 32.0   RDW Latest Ref Range: 10.0 - 15.0 % 16.1 (H)   Diff Method Unknown Manual Differential   % Neutrophils Latest Units: % 29.3   % Lymphocytes Latest Units: % 3.4   % Monocytes Latest Units: % 2.5   % Eosinophils Latest Units: % 0.0   % Basophils Latest Units: % 0.0   % Metamyelocytes Latest Units: % 3.4   % Myelocytes Latest Units: % 3.4   % Other Cells Latest Units: % 58.0   Absolute Neutrophil Latest Ref Range: 1.6 - 8.3 10e9/L 15.1 (H)   Absolute Lymphocytes Latest Ref Range: 0.8 - 5.3 10e9/L 1.8   Absolute Monocytes Latest Ref Range: 0.0  - 1.3 10e9/L 1.3   Absolute Eosinophils Latest Ref Range: 0.0 - 0.7 10e9/L 0.0   Absolute Basophils Latest Ref Range: 0.0 - 0.2 10e9/L 0.0   Absolute Metamyelocytes Latest Ref Range: 0 10e9/L 1.8 (H)   Absolute Myelocytes Latest Ref Range: 0 10e9/L 1.8 (H)   Absolute Other Cells Latest Ref Range: 0 10e9/L 30.0 (H)   Anisocytosis Unknown Slight   Macrocytes Unknown Present   Platelet Estimate Unknown Confirming automa...       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    1.  Newly diagnosed ALL.  We had a long discussion of the natural history of acute leukemia specifically acute lymphoblastic leukemia.  We discussed the standard approach to therapy is multi drug induction chemotherapy with the goal of achieving a complete remission and then moving on to delayed intensification, consolidation for multiple cycles, and eventual maintenance therapy.  We talked that the initial induction therapy is a hospital stay of 3-4 weeks with a chance of life-threatening toxicity on the order of 10-15% which can take forms of infection, organ toxicity, debilitation.  We discussed that the remission rates with this are approximately 50%.  We discussed that this hospitalization will require transfusions, antibiotics, and supportive care to deal with possible tumor lysis and disseminated intravascular coagulation.  We discussed that this is not curative and is for disease control.    An alternative approach would be to use blinatumomab upfront.  While that is currently FDA approved for relapse refractory ALL,  there is a phase 2 trial that was presented at [facility] that was a phase 2 trial of blinatumomab followed by prednisone vincristine methotrexate and 6-mercaptopurine maintenance in elderly patients with newly diagnosed AML L.  31 patients were treated with a meeting age of  75 years.  No patients  to the initial 28 days and the overall CR rate was 66%.  The median follow-up time of the trial was 1 year with an estimated overall survival at 6 months of 79% and at 1 year 65%.  This showed to be very well tolerated and well controlled the patient's ALL.  I discussed with him that this would require insurance approval given that it is an off label indication and that could be done through your office if he was getting therapy there.    We discussed the infusion approach that requires central access.  We discussed that this is a continuous infusion with the first week of therapy at a low-dose of 9 mcg daily for 1 week and then increase to 28 mcg and monitored in the hospital for 2 days and if tolerating then discharged to continue with the 28-day cycle outpatient.  We discussed the cytokine release syndrome that can occur, the neurotoxicity, the febrile neutropenia etc.  I think it is worthwhile to see if insurance will cover this and then go forward if they will.    I would recommend that he get started on Decadron for disease control given the rising WBC and blast count.  He likely will have some significant tumor lysis and whether or not that needs to be done in the hospital or not will be determined by his counts today.    I spent approximately 60 minutes with this patient with all in direct face-to-face consultation      Final Plan:  - Will set up an apt with you to discuss next steps  - Consider decadron to debulk his WBC/blast. He wants to try to control counts until after srikanth and honestly it may take that long to get blincyto covered  - Happy to be available to discuss his care and management going forward          Again, thank you for allowing me to participate in the care of your patient.      Sincerely,    Fara Sanchez MD

## 2018-12-06 NOTE — PROGRESS NOTES
[facility] Consult  12/6/2018      Jc:    Thank you for referring Mr. Arango to the [facility] at the [facility]. As you know his history well I will review it now for our records.    As you know he is a 79 yo man with a very complicated past medical history that includes cardiac disease, remote throat cancer, COPD, aortic valve replacement, CABG, etc. who has had a mildly elevated white count going back a couple of years and elevated hemoglobin.  In June 2016 he had a peripheral blood smear that showed macrocytic anemia with mild leukocytosis and no signs of dysplasia.  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  Cytogenetics were normal, BCR able fish was negative.  The flow cytometry of the blasts show that they were CD34 positive, CD19 positive, CD79a positive,  positive and negative for 13 and 33.  Also during his workup of leukocytosis he had a CT of the chest abdomen and pelvis on 11/17/2018 that showed splenomegaly and a right adrenal lesion as well as a bladder mass.  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    Given the finding of the new urothelial cancer as well as some other issues that need to be taking care of with a crown placement therapy has been held until this visit today to discuss possible options.  He and his significant other and 2 adult children to accompany him today to discuss next steps.    Anshul notes some increased fatigue, some mild increase in naps, some bruising, some lower appetite, 4 pound weight loss.  He notes no bone pain no night sweats no fevers or  chills.  He does note that he had a recent cellulitis behind the knee and has had a prolonged cough upper respiratory infection that finally resolved.  He comes in today to discuss next steps.    Past medical history:  1.  Acute lumbar blastic leukemia see HPI  2.  Throat cancer status post resection and radiation and approximate   3.  COPD  4.  Gout  5.  Recently diagnosed low-grade papillary urothelial cancer  6.  Hypertension  7.  Hyperlipidemia  8.  Aortic valve replacement back in   9.  CABG x1 in 2013  10.  Iron deficiency anemia  11.  Hernia repair in the   12.  Status post appendectomy  13.  History of cardiac stents  14.  Tonsillectomy  15.  Obstructive sleep apnea  16.  History of left MCA stroke in 2013  16.  Osteoarthritis  17.  Herniated disc    Meds: Zocor, Norvasc, amoxicillin predental work, hydrochlorothiazide, allopurinol, Diovan, Toprol-XL, Protonix, Eliquis which is currently on hold post urology procedure    Social history: No tobacco or alcohol.  His wife of many years  in  of fallopian tube carcinoma.  He has 2 adult children who would accompany him today.  He has a significant other that he is very social with.  He  worked [employer] for 36 years and retired in .    10 point review of systems is otherwise negative    Physical Exam:  There were no vitals taken for this visit.  Gen: And appears younger than his stated age HEENT: No icterus or injection  and well appearing  Extremities: Trace edema  Normal gait  Full exam not done is all is a 60-minute visit spent discussing next steps    Labs:  Results for ADAM MULLER (MRN [id number]) as of 2018 16:43   Ref. Range 2018 09:16   Sodium Latest Ref Range: 133 - 144 mmol/L 142   Potassium Latest Ref Range: 3.4 - 5.3 mmol/L 3.9   Chloride Latest Ref Range: 94 - 109 mmol/L 109   Carbon Dioxide Latest Ref Range: 20 - 32 mmol/L 23   Urea Nitrogen Latest Ref Range: 7 - 30 mg/dL 27   Creatinine Latest Ref  Range: 0.66 - 1.25 mg/dL 1.25   GFR Estimate Latest Ref Range: >60 mL/min/1.7m2 56 (L)   GFR Estimate If Black Latest Ref Range: >60 mL/min/1.7m2 68   Calcium Latest Ref Range: 8.5 - 10.1 mg/dL 8.7   Anion Gap Latest Ref Range: 3 - 14 mmol/L 10   Albumin Latest Ref Range: 3.4 - 5.0 g/dL 3.8   Protein Total Latest Ref Range: 6.8 - 8.8 g/dL 7.2   Bilirubin Total Latest Ref Range: 0.2 - 1.3 mg/dL 0.5   Alkaline Phosphatase Latest Ref Range: 40 - 150 U/L 82   ALT Latest Ref Range: 0 - 70 U/L 22   AST Latest Ref Range: 0 - 45 U/L 23   Lactate Dehydrogenase Latest Ref Range: 85 - 227 U/L 302 (H)   Uric Acid Latest Ref Range: 3.5 - 7.2 mg/dL 5.5   Glucose Latest Ref Range: 70 - 99 mg/dL 100 (H)   WBC Latest Ref Range: 4.0 - 11.0 10e9/L 51.7 (HH)   Hemoglobin Latest Ref Range: 13.3 - 17.7 g/dL 13.0 (L)   Hematocrit Latest Ref Range: 40.0 - 53.0 % 40.6   Platelet Count Latest Ref Range: 150 - 450 10e9/L 82 (L)   RBC Count Latest Ref Range: 4.4 - 5.9 10e12/L 3.74 (L)   MCV Latest Ref Range: 78 - 100 fl 109 (H)   MCH Latest Ref Range: 26.5 - 33.0 pg 34.8 (H)   MCHC Latest Ref Range: 31.5 - 36.5 g/dL 32.0   RDW Latest Ref Range: 10.0 - 15.0 % 16.1 (H)   Diff Method Unknown Manual Differential   % Neutrophils Latest Units: % 29.3   % Lymphocytes Latest Units: % 3.4   % Monocytes Latest Units: % 2.5   % Eosinophils Latest Units: % 0.0   % Basophils Latest Units: % 0.0   % Metamyelocytes Latest Units: % 3.4   % Myelocytes Latest Units: % 3.4   % Other Cells Latest Units: % 58.0   Absolute Neutrophil Latest Ref Range: 1.6 - 8.3 10e9/L 15.1 (H)   Absolute Lymphocytes Latest Ref Range: 0.8 - 5.3 10e9/L 1.8   Absolute Monocytes Latest Ref Range: 0.0 - 1.3 10e9/L 1.3   Absolute Eosinophils Latest Ref Range: 0.0 - 0.7 10e9/L 0.0   Absolute Basophils Latest Ref Range: 0.0 - 0.2 10e9/L 0.0   Absolute Metamyelocytes Latest Ref Range: 0 10e9/L 1.8 (H)   Absolute Myelocytes Latest Ref Range: 0 10e9/L 1.8 (H)   Absolute Other Cells Latest Ref  Range: 0 10e9/L 30.0 (H)   Anisocytosis Unknown Slight   Macrocytes Unknown Present   Platelet Estimate Unknown Confirming automa...       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    1.  Newly diagnosed ALL.  We had a long discussion of the natural history of acute leukemia specifically acute lymphoblastic leukemia.  We discussed the standard approach to therapy is multi drug induction chemotherapy with the goal of achieving a complete remission and then moving on to delayed intensification, consolidation for multiple cycles, and eventual maintenance therapy.  We talked that the initial induction therapy is a hospital stay of 3-4 weeks with a chance of life-threatening toxicity on the order of 10-15% which can take forms of infection, organ toxicity, debilitation.  We discussed that the remission rates with this are approximately 50%.  We discussed that this hospitalization will require transfusions, antibiotics, and supportive care to deal with possible tumor lysis and disseminated intravascular coagulation.  We discussed that this is not curative and is for disease control.    An alternative approach would be to use blinatumomab upfront.  While that is currently FDA approved for relapse refractory ALL,  there is a phase 2 trial that was [facility] that was a phase 2 trial of blinatumomab followed by prednisone vincristine methotrexate and 6-mercaptopurine maintenance in elderly patients with newly diagnosed AML L.  31 patients were treated with a meeting age of 75 years.  No patients  to the initial 28 days and the overall CR rate was 66%.  The median follow-up time of the trial was 1 year with an estimated overall survival at 6 months of 79% and at 1 year 65%.  This showed to be very well tolerated and well controlled the patient's ALL.  I  discussed with him that this would require insurance approval given that it is an off label indication and that could be done through your office if he was getting therapy there.    We discussed the infusion approach that requires central access.  We discussed that this is a continuous infusion with the first week of therapy at a low-dose of 9 mcg daily for 1 week and then increase to 28 mcg and monitored in the hospital for 2 days and if tolerating then discharged to continue with the 28-day cycle outpatient.  We discussed the cytokine release syndrome that can occur, the neurotoxicity, the febrile neutropenia etc.  I think it is worthwhile to see if insurance will cover this and then go forward if they will.    I would recommend that he get started on Decadron for disease control given the rising WBC and blast count.  He likely will have some significant tumor lysis and whether or not that needs to be done in the hospital or not will be determined by his counts today.    I spent approximately 60 minutes with this patient with all in direct face-to-face consultation      Final Plan:  - Will set up an apt with you to discuss next steps  - Consider decadron to debulk his WBC/blast. He wants to try to control counts until after srikanth and honestly it may take that long to get blincyto covered  - Happy to be available to discuss his care and management going forward      Fara Sanchez

## 2018-12-06 NOTE — MR AVS SNAPSHOT
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  After hours call the   hospital at [phone] or   [phone]. Ask for the BMT   fellow on-call            Follow-ups after your visit        Your next 10 appointments already scheduled     Dec 13, 2018  9:10 AM CST   LAB with [facility]RA[facility]   [facility] ([facility])    [address] [place] [id number] [phone]           Please do not eat 10-12 hours before your appointment if you are coming in fasting for labs on lipids, cholesterol, or glucose (sugar). This does not apply to pregnant women. Water, hot tea and black coffee (with nothing added) are okay. Do not drink other fluids, diet soda or chew gum.            Dec 13, 2018 10:15 AM CST   Return Visit with Pierce Fountain MD   [facility] ([facility])    [address] [place], [place] [address]  [facility] [id number] [phone] OPT 2              Who to contact     If you have questions or need follow up information about today's clinic visit or your schedule please contact [facility] directly at [phone].  Normal or non-critical lab and imaging results will be communicated to you by MyChart, letter or phone within 4 business days after the clinic has  "received the results. If you do not hear from us within 7 days, please contact the clinic through Destineer or phone. If you have a critical or abnormal lab result, we will notify you by phone as soon as possible.  Submit refill requests through Destineer or call your pharmacy and they will forward the refill request to us. Please allow 3 business days for your refill to be completed.          Additional Information About Your Visit        Destineer Information     Destineer lets you send messages to your doctor, view your test results, renew your prescriptions, schedule appointments and more. To sign up, go to [email or web address] . Click on \"Log in\" on the left side of the screen, which will take you to the Welcome page. Then click on \"Sign up Now\" on the right side of the page.     You will be asked to enter the access code listed below, as well as some personal information. Please follow the directions to create your username and password.     Your access code is: [email or web address]  Expires: 2019  3:10 PM     Your access code will  in 90 days. If you need help or a new code, please call your [place] clinic or [phone].        Care EveryWhere ID     This is your Care EveryWhere ID. This could be used by other organizations to access your [place] medical records  [id number]         Blood Pressure from Last 3 Encounters:   18 123/81   17 138/70   17 132/71    Weight from Last 3 Encounters:   18 93.4 kg (205 lb 14.4 oz)   17 95 kg (209 lb 6.4 oz)   17 91.2 kg (201 lb)              We Performed the Following     CBC with platelets differential     Comprehensive metabolic panel     Lactate Dehydrogenase     Uric acid          Today's Medication Changes          These changes are accurate as of 18  4:51 PM.  If you have any questions, ask your nurse or doctor.               These medicines have changed or have updated prescriptions.        Dose/Directions    pantoprazole " 40 MG EC tablet   Commonly known as:  PROTONIX   This may have changed:  when to take this   Used for:  Upper GI bleed        Dose:  40 mg   Take 1 tablet (40 mg) by mouth 2 times daily   Quantity:  90 tablet   Refills:  1                Recent Review Flowsheet Data     BMT Recent Results Latest Ref Rng & Units 6/27/2017 6/27/2017 7/3/2017 10/10/2017 11/8/2017 3/30/2018 12/6/2018    WBC 4.0 - 11.0 10e9/L - - - 13.3 - - 51.7(HH)    Hemoglobin 13.3 - 17.7 g/dL 9.7(L) 10.2(L) 11.6 11.4 - - 13.0(L)    Platelet Count 150 - 450 10e9/L - - - 173 - - 82(L)    Platelets 150 - 450 10:9/L - - - - - - -    Neutrophils (Absolute) 1.6 - 8.3 10e9/L - - - - - - 15.1(H)    INR 0.86 - 1.14 - - - - - - -    Sodium 133 - 144 mmol/L - - 143 - 139 144 142    Potassium 3.4 - 5.3 mmol/L - - 4.1 - 4.0 4.0 3.9    Chloride 94 - 109 mmol/L - - 108 - 103 105 109    Glucose 70 - 99 mg/dL - - 99 - 107(H) 106(H) 100(H)    Urea Nitrogen 7 - 30 mg/dL - - 20 - 19 19 27    Creatinine 0.66 - 1.25 mg/dL - - 1.30 - 1.28 1.23 1.25    Calcium (Total) 8.5 - 10.1 mg/dL - - 9.4 - 9.3 9.5 8.7    Protein (Total) 6.8 - 8.8 g/dL - - - - - - 7.2    Albumin 3.4 - 5.0 g/dL - - - - - - 3.8    Alkaline Phosphatase 40 - 150 U/L - - - - - - 82    AST 0 - 45 U/L - - - - - - 23    ALT 0 - 70 U/L - - - - <5(L) <5(L) 22    MCV 78 - 100 fl - - - 95.3 - - 109(H)               Primary Care Provider Office Phone # Fax #    Ashutosh Melgar [phone]       [facility] [address] [place] [address]        Equal Access to Services     RITA PAULSON : Geri orellanao Somark, waaxda luqadaha, qaybta kaalmada adeegyada, waxay idiin haymigueln graeme bustamantearanyla blank. So [facility] [phone].    ATENCIÓN: Si habla español, tiene a griffin disposición servicios gratuitos de asistencia lingüística. Llame al [phone].    We comply with applicable federal civil rights laws and [place] laws. We do not discriminate on the basis of race,  color, national origin, age, disability, sex, sexual orientation, or gender identity.            Thank you!     Thank you for choosing [facility]  for your care. Our goal is always to provide you with excellent care. Hearing back from our patients is one way we can continue to improve our services. Please take a few minutes to complete the written survey that you may receive in the mail after your visit with us. Thank you!             Your Updated Medication List - Protect others around you: Learn how to safely use, store and throw away your medicines at www.disposemymeds.org.          This list is accurate as of 12/6/18  4:51 PM.  Always use your most recent med list.                   Brand Name Dispense Instructions for use Diagnosis    acetaminophen 500 MG Caps      Take 500 mg by mouth daily as needed        ALLOPURINOL PO      Take 300 mg by mouth daily. Takes in AM        AMOXICILLIN PO      Take 2,000 mg by mouth See Admin Instructions As directed before dental work        apixaban ANTICOAGULANT 5 MG tablet    ELIQUIS     Take 1 tablet (5 mg) by mouth 2 times daily    PAF (paroxysmal atrial fibrillation) (H)       hydrochlorothiazide 12.5 MG capsule    MICROZIDE     Take 25 mg by mouth every morning        irbesartan 300 MG tablet    AVAPRO    90 tablet    Take 1 tablet (300 mg) by mouth daily    Benign essential hypertension       loperamide 2 MG tablet    IMODIUM A-D     Take 2 mg by mouth 2 times daily as needed        MECLIZINE HCL PO      Take 25 mg by mouth        MELATONIN PO      Take 3 mg by mouth        metoprolol succinate  MG 24 hr tablet    TOPROL-XL     Take 100 mg by mouth daily        nystatin 645224 UNIT/ML suspension    MYCOSTATIN     Take 500,000 Units by mouth 4 times daily        pantoprazole 40 MG EC tablet    PROTONIX    90 tablet    Take 1 tablet (40 mg) by mouth 2 times daily    Upper GI bleed       simvastatin 40 MG tablet    ZOCOR     Take 40 mg by  mouth At Bedtime        TRAZODONE HCL PO      Take 50 mg by mouth        valsartan-hydrochlorothiazide 320-12.5 MG tablet    DIOVAN HCT     Take 1 tablet by mouth daily        VIAGRA 100 MG tablet   Generic drug:  sildenafil     30 tablet    Take 1 tablet (100 mg) by mouth daily as needed for erectile dysfunction        VITAMIN D3 PO      Take 2,000 Units by mouth daily

## 2018-12-12 NOTE — TELEPHONE ENCOUNTER
Patient is requesting having Oncology labs drawn same time his lab appt 9:10 on 12-13-18 Received written orders from Dr. Eng

## 2018-12-13 NOTE — LETTER
12/13/2018      Ashutosh Melgar MD  [facility] [address] [place] [address]      RE: Anshul Arango       Dear Colleague,    I had the pleasure of seeing Anshul Arango in the [facility].    Service Date: 12/13/2018      OFFICE PROGRESS NOTE       REASON FOR CARDIOLOGY VISIT:  Follow up AVR, coronary artery disease.      HISTORY OF PRESENT ILLNESS:  Mr. Arango is a very pleasant 78-year-old gentleman with history of bicuspid aortic valve with severe aortic stenosis for which he underwent aortic valve replacement with [employer] Azam Trifecta valve 23 mm in 04/2013 along with single-vessel CABG, SVG to RPDA.  He has other comorbidities of  stroke and was followed by Neurology and had a Reveal implantable loop recorder at [facility] and was found to have brief episode of paroxysmal atrial fibrillation.  The Reveal device was explanted in 12/2016.  He was started on apixaban.  Last summer, he had GI bleed and melena and was found to have gastric ulcer and duodenal ulcer and underwent treatment for same.  He was started on PPI and eventually started back on apixaban and aspirin was discontinued.  Since that time, he has done quite well.  He had a normal stress test in 10/2016.  Today, he is coming for routine followup.  Recently, he was diagnosed with ALL and bladder cancer.  It looks like the bladder cancer was not invasive.  The ALL requires chemotherapy which he will be starting soon.  He is going to have a port done soon.  He had an echocardiogram done at [facility].  I looked at the report through Care Everywhere.  It showed normal LV function of 65%, normal gradients across the bioprosthetic valve of 9 mmHg, mild mitral stenosis with mean gradient of 4 mmHg and heart rate of 66 beats per minute.  Severe mitral annular calcification was noted.  The aorta size was reported to be normal.  The patient denies any chest discomfort or  shortness of breath.  In view of port insertion, it looks like his apixaban has been withheld briefly.      PHYSICAL EXAMINATION:     VITALS SIGNS:  Blood pressure 130/68, heart rate 72 and regular, weight 207 pounds, BMI 33.48.   GENERAL:  The patient appears pleasant, comfortable.   NECK:  Normal JVP, no bruit.   CARDIOVASCULAR SYSTEM:  S1, S2 normal.  There is grade 2/6 ejection systolic murmur heard over the right upper sternal border.  No diastolic murmur appreciated.  No S3, S4, rub or gallop.   RESPIRATORY SYSTEM:  Clear to auscultation bilaterally.   GASTROINTESTINAL SYSTEM:  Abdomen soft, nontender.   EXTREMITIES:  No pitting pedal edema.   NEUROLOGIC:  Alert, oriented x3.    PSYCHIATRIC:  Normal affect.    SKIN:  No obvious rash.    HEENT:  No pallor or icterus.      IMPRESSION AND PLAN:  A pleasant 78-year-old gentleman with history of bicuspid aortic valve replacement, single-vessel CABG, normal LV function, mild mitral stenosis in the setting of severe MAC, recently diagnosed with ALL and bladder cancer.  He is now supposed to start chemotherapy for ALL.  Overall, cardiac status-wise, he is doing well without any symptoms of angina or congestive heart failure.  He has also a history of paroxysmal atrial fibrillation and on apixaban for stroke prophylaxis.  That has been withheld briefly, it looks like, in preparation for port insertion.  This could be resumed after the port insertion.  We can see him back in 6 months with repeat echocardiogram.  I am not sure on what chemotherapy the patient will be getting started.  In case patient is getting started on a potential cardiotoxic chemotherapy, he may require serial echocardiograms which I will defer to his oncologist, Dr. Jc Eng.  I will be more than happy to help with any cardiac input needed.  At this time, he appears quite stable from a cardiac standpoint of view.        cc:   Jc Eng MD    [facility]   [address]  [place] [address]         ELMA SOLER MD             D: 2018   T: 2018   MT: JUDSON      Name:     ADAM MULLER   MRN:      [id number]        Account:      [id number]   :      1940           Service Date: 2018      Document: [id number]           Outpatient Encounter Medications as of 2018   Medication Sig Dispense Refill     acetaminophen 500 MG CAPS Take 500 mg by mouth daily as needed        ALLOPURINOL PO Take 300 mg by mouth daily. Takes in AM       AMOXICILLIN PO Take 2,000 mg by mouth See Admin Instructions As directed before dental work       Cholecalciferol (VITAMIN D3 PO) Take 2,000 Units by mouth daily        hydrochlorothiazide (MICROZIDE) 12.5 MG capsule Take 25 mg by mouth every morning        irbesartan (AVAPRO) 300 MG tablet Take 1 tablet (300 mg) by mouth daily 90 tablet 3     loperamide (IMODIUM A-D) 2 MG tablet Take 2 mg by mouth 2 times daily as needed        MECLIZINE HCL PO Take 25 mg by mouth every 6 hours as needed        MELATONIN PO Take 3 mg by mouth       metoprolol (TOPROL-XL) 100 MG 24 hr tablet Take 100 mg by mouth daily       pantoprazole (PROTONIX) 40 MG EC tablet Take 1 tablet (40 mg) by mouth 2 times daily (Patient taking differently: Take 40 mg by mouth daily ) 90 tablet 1     [] predniSONE (DELTASONE) 20 MG tablet Take 120 mg by mouth.       simvastatin (ZOCOR) 40 MG tablet Take 40 mg by mouth At Bedtime        sulfamethoxazole-trimethoprim (BACTRIM DS/SEPTRA DS) 800-160 MG tablet Take 1 tablet by mouth       TRAZODONE HCL PO Take 50 mg by mouth       apixaban ANTICOAGULANT (ELIQUIS) 5 MG tablet Take 1 tablet (5 mg) by mouth 2 times daily (Patient not taking: Reported on 2018)       nystatin (MYCOSTATIN) 236305 UNIT/ML suspension Take 500,000 Units by mouth 4 times daily       sildenafil (VIAGRA) 100 MG tablet Take 1 tablet (100 mg) by mouth daily as needed for erectile dysfunction 30 tablet       [DISCONTINUED] valsartan-hydrochlorothiazide (DIOVAN HCT) 320-12.5 MG tablet Take 1 tablet by mouth daily       No facility-administered encounter medications on file as of 12/13/2018.              Again, thank you for allowing me to participate in the care of your patient.      Sincerely,    Pierce Fountain MD     [facility]

## 2018-12-13 NOTE — LETTER
12/13/2018    Ashutosh Melgar MD  [facility] [address] [place] [address]    RE: Anshul Arango       Dear Colleague,    I had the pleasure of seeing Anshul Arango in the [facility].    HPI and Plan:   See dictation(#[id number])    Orders Placed This Encounter   Procedures     Follow-Up with Cardiologist     Echocardiogram Complete       Orders Placed This Encounter   Medications     predniSONE (DELTASONE) 20 MG tablet     Sig: Take 120 mg by mouth.     sulfamethoxazole-trimethoprim (BACTRIM DS/SEPTRA DS) 800-160 MG tablet     Sig: Take 1 tablet by mouth       Medications Discontinued During This Encounter   Medication Reason     valsartan-hydrochlorothiazide (DIOVAN HCT) 320-12.5 MG tablet Medication Reconciliation Clean Up         Encounter Diagnosis   Name Primary?     S/P AVR Yes       CURRENT MEDICATIONS:  Current Outpatient Medications   Medication Sig Dispense Refill     acetaminophen 500 MG CAPS Take 500 mg by mouth daily as needed        ALLOPURINOL PO Take 300 mg by mouth daily. Takes in AM       AMOXICILLIN PO Take 2,000 mg by mouth See Admin Instructions As directed before dental work       Cholecalciferol (VITAMIN D3 PO) Take 2,000 Units by mouth daily        hydrochlorothiazide (MICROZIDE) 12.5 MG capsule Take 25 mg by mouth every morning        irbesartan (AVAPRO) 300 MG tablet Take 1 tablet (300 mg) by mouth daily 90 tablet 3     loperamide (IMODIUM A-D) 2 MG tablet Take 2 mg by mouth 2 times daily as needed        MECLIZINE HCL PO Take 25 mg by mouth every 6 hours as needed        MELATONIN PO Take 3 mg by mouth       metoprolol (TOPROL-XL) 100 MG 24 hr tablet Take 100 mg by mouth daily       pantoprazole (PROTONIX) 40 MG EC tablet Take 1 tablet (40 mg) by mouth 2 times daily (Patient taking differently: Take 40 mg by mouth daily ) 90 tablet 1     predniSONE (DELTASONE) 20 MG tablet Take 120 mg by mouth.       simvastatin (ZOCOR) 40  MG tablet Take 40 mg by mouth At Bedtime        sulfamethoxazole-trimethoprim (BACTRIM DS/SEPTRA DS) 800-160 MG tablet Take 1 tablet by mouth       TRAZODONE HCL PO Take 50 mg by mouth       apixaban ANTICOAGULANT (ELIQUIS) 5 MG tablet Take 1 tablet (5 mg) by mouth 2 times daily (Patient not taking: Reported on 12/6/2018)       nystatin (MYCOSTATIN) 395342 UNIT/ML suspension Take 500,000 Units by mouth 4 times daily       sildenafil (VIAGRA) 100 MG tablet Take 1 tablet (100 mg) by mouth daily as needed for erectile dysfunction 30 tablet        ALLERGIES     Allergies   Allergen Reactions     Codeine Sulfate      VERY LIGHTHEADED     Caffeine      Much dizziness & anxiety       PAST MEDICAL HISTORY:  Past Medical History:   Diagnosis Date     Acute anterior wall MI (H)      Anemia     iron deficiency     Aortic stenosis     bioprosthetic AVR 4/2013     Arthritis      CAD (coronary artery disease)     s/p CABG (SVG to RPD) 4/2013     Cardiac arrest - ventricular fibrillation      CVA (cerebral vascular accident) (H)     2013, 2014     Gout      Hyperlipaemia      Hypertension      Inguinal hernia without mention of obstruction or gangrene, unilateral or unspecified, (not specified as recurrent)      Laryngeal cancer (H)      Osteoarthrosis      Paroxysmal atrial fibrillation (H)     20 seconds detected on Reveal     Pericardial effusion     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     Sepsis (H)      Sleep apnea     uses CPAP     Upper GI bleed     11/08       PAST SURGICAL HISTORY:  Past Surgical History:   Procedure Laterality Date     APPENDECTOMY       bilateral tka       BYPASS GRAFT ARTERY CORONARY, REPLACE VALVE AORTIC, COMBINED  4/9/2013    Procedure: COMBINED BYPASS GRAFT ARTERY CORONARY, REPLACE VALVE AORTIC;  BYPASS GRAFT ARTERY CORONARYX 1 (VEIN GRAFT TO PDA), REPLACE VALVE AORTIC (TRIFECTA 23MM), ENDOSCOPIC VEIN HARVEST(ON PUMP);  Surgeon: Lenny Rowland MD;  Location:  OR     CARDIAC SURGERY   2013    AVR for severe aortic stenosis with 1 vessel CABG (SVG to RPDA, 23 mm [employer].Azam Trifecta tissue valve)     ENT SURGERY      t & a     ESOPHAGOSCOPY, GASTROSCOPY, DUODENOSCOPY (EGD), COMBINED N/A 2017    Procedure: COMBINED ESOPHAGOSCOPY, GASTROSCOPY, DUODENOSCOPY (EGD), BIOPSY SINGLE OR MULTIPLE;  gastroscopy;  Surgeon: Jorje Craft MD;  Location:  GI     HC LOOP RECORDER IMPLANT      [facility]     HERNIA REPAIR       INCISION AND DRAINAGE STERNUM W/ IRRIGATION SYSTEM, COMBINED  2013    Procedure: COMBINED INCISION AND DRAINAGE STERNUM W/ IRRIGATION SYSTEM;  INCISION AND DRAINAGE OF STERNAL WOUND;  Surgeon: Lenny Rowland MD;  Location:  OR     ORTHOPEDIC SURGERY      anjel tka        FAMILY HISTORY:  Family History   Problem Relation Age of Onset     Chronic Obstructive Pulmonary Disease Mother      Alzheimer Disease Father      Myocardial Infarction Paternal Grandfather      Myocardial Infarction Brother        SOCIAL HISTORY:  Social History     Socioeconomic History     Marital status:      Spouse name: None     Number of children: None     Years of education: None     Highest education level: None   Social Needs     Financial resource strain: None     Food insecurity - worry: None     Food insecurity - inability: None     Transportation needs - medical: None     Transportation needs - non-medical: None   Occupational History     None   Tobacco Use     Smoking status: Former Smoker     Packs/day: 1.00     Years: 40.00     Pack years: 40.00     Types: Cigarettes, Cigars     Start date:      Last attempt to quit: 2000     Years since quittin.6     Smokeless tobacco: Never Used   Substance and Sexual Activity     Alcohol use: Yes     Comment: one drink/day     Drug use: No     Sexual activity: None   Other Topics Concern     Parent/sibling w/ CABG, MI or angioplasty before 65F 55M? No      Service Not Asked     Blood Transfusions Not Asked  "    Caffeine Concern Yes     Comment: drinks de-caf     Occupational Exposure Not Asked     Hobby Hazards Not Asked     Sleep Concern Yes     Comment: sleep apnea, wears cpap at night     Stress Concern No     Weight Concern No     Special Diet No     Back Care Not Asked     Exercise No     Comment: nothing regular      Bike Helmet Not Asked     Seat Belt Not Asked     Self-Exams Not Asked   Social History Narrative     None       Review of Systems:  Skin:  Negative bruising skin cancer, has mohs procedure   Eyes:  Positive for contacts    ENT:  Negative nasal congestion chronic  Respiratory:  Positive for dyspnea on exertion;sleep apnea;CPAP     Cardiovascular:  Negative Positive for;dizziness vertigo - [facility]  Gastroenterology: Negative diarrhea(occasional, related to foods)    Genitourinary:  Negative      Musculoskeletal:  Positive for neck pain;back pain chronic  Neurologic:  Positive for numbness or tingling of feet    Psychiatric:  Negative      Heme/Lymph/Imm:  Positive for allergies seasonal  Endocrine:  Negative        Physical Exam:  Vitals: /68 (BP Location: Left arm, Patient Position: Chair, Cuff Size: Adult Regular)   Pulse 72   Ht 1.676 m (5' 6\")   Wt 93.9 kg (207 lb)   SpO2 97%   BMI 33.41 kg/m       Constitutional:           Skin:             Head:           Eyes:           Lymph:      ENT:           Neck:           Respiratory:            Cardiac:                                                           GI:           Extremities and Muscular Skeletal:                 Neurological:           Psych:             CC  Ashutosh Melgar MD  [facility] [address] [place] [place] [address]                    Thank you for allowing me to participate in the care of your patient.      Sincerely,     Pierce Fountain MD     [facility]    cc:   Ashutosh Melgar MD  [facility] [address] [place] [address]

## 2018-12-13 NOTE — PROGRESS NOTES
HPI and Plan:   See dictation(#[id number])    Orders Placed This Encounter   Procedures     Follow-Up with Cardiologist     Echocardiogram Complete       Orders Placed This Encounter   Medications     predniSONE (DELTASONE) 20 MG tablet     Sig: Take 120 mg by mouth.     sulfamethoxazole-trimethoprim (BACTRIM DS/SEPTRA DS) 800-160 MG tablet     Sig: Take 1 tablet by mouth       Medications Discontinued During This Encounter   Medication Reason     valsartan-hydrochlorothiazide (DIOVAN HCT) 320-12.5 MG tablet Medication Reconciliation Clean Up         Encounter Diagnosis   Name Primary?     S/P AVR Yes       CURRENT MEDICATIONS:  Current Outpatient Medications   Medication Sig Dispense Refill     acetaminophen 500 MG CAPS Take 500 mg by mouth daily as needed        ALLOPURINOL PO Take 300 mg by mouth daily. Takes in AM       AMOXICILLIN PO Take 2,000 mg by mouth See Admin Instructions As directed before dental work       Cholecalciferol (VITAMIN D3 PO) Take 2,000 Units by mouth daily        hydrochlorothiazide (MICROZIDE) 12.5 MG capsule Take 25 mg by mouth every morning        irbesartan (AVAPRO) 300 MG tablet Take 1 tablet (300 mg) by mouth daily 90 tablet 3     loperamide (IMODIUM A-D) 2 MG tablet Take 2 mg by mouth 2 times daily as needed        MECLIZINE HCL PO Take 25 mg by mouth every 6 hours as needed        MELATONIN PO Take 3 mg by mouth       metoprolol (TOPROL-XL) 100 MG 24 hr tablet Take 100 mg by mouth daily       pantoprazole (PROTONIX) 40 MG EC tablet Take 1 tablet (40 mg) by mouth 2 times daily (Patient taking differently: Take 40 mg by mouth daily ) 90 tablet 1     predniSONE (DELTASONE) 20 MG tablet Take 120 mg by mouth.       simvastatin (ZOCOR) 40 MG tablet Take 40 mg by mouth At Bedtime        sulfamethoxazole-trimethoprim (BACTRIM DS/SEPTRA DS) 800-160 MG tablet Take 1 tablet by mouth       TRAZODONE HCL PO Take 50 mg by mouth       apixaban ANTICOAGULANT (ELIQUIS) 5 MG tablet Take 1 tablet (5 mg)  by mouth 2 times daily (Patient not taking: Reported on 12/6/2018)       nystatin (MYCOSTATIN) 007593 UNIT/ML suspension Take 500,000 Units by mouth 4 times daily       sildenafil (VIAGRA) 100 MG tablet Take 1 tablet (100 mg) by mouth daily as needed for erectile dysfunction 30 tablet        ALLERGIES     Allergies   Allergen Reactions     Codeine Sulfate      VERY LIGHTHEADED     Caffeine      Much dizziness & anxiety       PAST MEDICAL HISTORY:  Past Medical History:   Diagnosis Date     Acute anterior wall MI (H)      Anemia     iron deficiency     Aortic stenosis     bioprosthetic AVR 4/2013     Arthritis      CAD (coronary artery disease)     s/p CABG (SVG to RPD) 4/2013     Cardiac arrest - ventricular fibrillation      CVA (cerebral vascular accident) (H)     2013, 2014     Gout      Hyperlipaemia      Hypertension      Inguinal hernia without mention of obstruction or gangrene, unilateral or unspecified, (not specified as recurrent)      Laryngeal cancer (H)      Osteoarthrosis      Paroxysmal atrial fibrillation (H)     20 seconds detected on Reveal     Pericardial effusion     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     Sepsis (H)      Sleep apnea     uses CPAP     Upper GI bleed     11/08       PAST SURGICAL HISTORY:  Past Surgical History:   Procedure Laterality Date     APPENDECTOMY       bilateral tka       BYPASS GRAFT ARTERY CORONARY, REPLACE VALVE AORTIC, COMBINED  4/9/2013    Procedure: COMBINED BYPASS GRAFT ARTERY CORONARY, REPLACE VALVE AORTIC;  BYPASS GRAFT ARTERY CORONARYX 1 (VEIN GRAFT TO PDA), REPLACE VALVE AORTIC (TRIFECTA 23MM), ENDOSCOPIC VEIN HARVEST(ON PUMP);  Surgeon: Lenny Rowland MD;  Location:  OR     CARDIAC SURGERY  4/2013    AVR for severe aortic stenosis with 1 vessel CABG (SVG to RPDA, 23 mm ST.Azam Trifecta tissue valve)     ENT SURGERY      t & a     ESOPHAGOSCOPY, GASTROSCOPY, DUODENOSCOPY (EGD), COMBINED N/A 6/26/2017    Procedure: COMBINED ESOPHAGOSCOPY,  GASTROSCOPY, DUODENOSCOPY (EGD), BIOPSY SINGLE OR MULTIPLE;  gastroscopy;  Surgeon: Jorje Craft MD;  Location:  GI     HC LOOP RECORDER IMPLANT      [facility]     HERNIA REPAIR       INCISION AND DRAINAGE STERNUM W/ IRRIGATION SYSTEM, COMBINED  2013    Procedure: COMBINED INCISION AND DRAINAGE STERNUM W/ IRRIGATION SYSTEM;  INCISION AND DRAINAGE OF STERNAL WOUND;  Surgeon: Lenny Rowland MD;  Location:  OR     ORTHOPEDIC SURGERY      anjel tka        FAMILY HISTORY:  Family History   Problem Relation Age of Onset     Chronic Obstructive Pulmonary Disease Mother      Alzheimer Disease Father      Myocardial Infarction Paternal Grandfather      Myocardial Infarction Brother        SOCIAL HISTORY:  Social History     Socioeconomic History     Marital status:      Spouse name: None     Number of children: None     Years of education: None     Highest education level: None   Social Needs     Financial resource strain: None     Food insecurity - worry: None     Food insecurity - inability: None     Transportation needs - medical: None     Transportation needs - non-medical: None   Occupational History     None   Tobacco Use     Smoking status: Former Smoker     Packs/day: 1.00     Years: 40.00     Pack years: 40.00     Types: Cigarettes, Cigars     Start date:      Last attempt to quit: 2000     Years since quittin.6     Smokeless tobacco: Never Used   Substance and Sexual Activity     Alcohol use: Yes     Comment: one drink/day     Drug use: No     Sexual activity: None   Other Topics Concern     Parent/sibling w/ CABG, MI or angioplasty before 65F 55M? No      Service Not Asked     Blood Transfusions Not Asked     Caffeine Concern Yes     Comment: drinks de-caf     Occupational Exposure Not Asked     Hobby Hazards Not Asked     Sleep Concern Yes     Comment: sleep apnea, wears cpap at night     Stress Concern No     Weight Concern No     Special Diet No      "Back Care Not Asked     Exercise No     Comment: nothing regular      Bike Helmet Not Asked     Seat Belt Not Asked     Self-Exams Not Asked   Social History Narrative     None       Review of Systems:  Skin:  Negative bruising skin cancer, has mohs procedure   Eyes:  Positive for contacts    ENT:  Negative nasal congestion chronic  Respiratory:  Positive for dyspnea on exertion;sleep apnea;CPAP     Cardiovascular:  Negative Positive for;dizziness vertigo - [facility]  Gastroenterology: Negative diarrhea(occasional, related to foods)    Genitourinary:  Negative      Musculoskeletal:  Positive for neck pain;back pain chronic  Neurologic:  Positive for numbness or tingling of feet    Psychiatric:  Negative      Heme/Lymph/Imm:  Positive for allergies seasonal  Endocrine:  Negative        Physical Exam:  Vitals: /68 (BP Location: Left arm, Patient Position: Chair, Cuff Size: Adult Regular)   Pulse 72   Ht 1.676 m (5' 6\")   Wt 93.9 kg (207 lb)   SpO2 97%   BMI 33.41 kg/m      Constitutional:           Skin:             Head:           Eyes:           Lymph:      ENT:           Neck:           Respiratory:            Cardiac:                                                           GI:           Extremities and Muscular Skeletal:                 Neurological:           Psych:             CC  Ashutosh Melgar MD  [facility]  [address] [place] [address]                  "

## 2018-12-13 NOTE — LETTER
12/13/2018      Ashutosh Melgar MD  [facility] [address] [facility] [address] [address] [place] [address]      RE: Anshul Arango       Dear Colleague,    I had the pleasure of seeing Anshul Arango in the [facility].    Service Date: 12/13/2018      OFFICE PROGRESS NOTE       REASON FOR CARDIOLOGY VISIT:  Follow up AVR, coronary artery disease.      HISTORY OF PRESENT ILLNESS:  Mr. Arango is a very pleasant 78-year-old gentleman with history of bicuspid aortic valve with severe aortic stenosis for which he underwent aortic valve replacement with St. Azam Trifecta valve 23 mm in 04/2013 along with single-vessel CABG, SVG to RPDA.  He has other comorbidities of *** stroke and was followed by Neurology and had a Reveal implantable loop recorder at [facility] and was found to have brief episode of paroxysmal atrial fibrillation.  The Reveal device was explanted in 12/2016.  He was started on apixaban.  Last summer, he had GI bleed and melena and was found to have gastric ulcer and duodenal ulcer and underwent treatment for same.  He was started on PPI and eventually started back on apixaban and aspirin was discontinued.  Since that time, he has done quite well.  He had a normal stress test in 10/2016.  Today, he is coming for routine followup.  Recently, he was diagnosed with ALL and bladder cancer.  It looks like the bladder cancer was not invasive.  The ALL requires chemotherapy which he will be starting soon.  He is going to have a port done soon.  He had an echocardiogram done at [facility].  I looked at the report through Care Everywhere.  It showed normal LV function of 65%, normal gradients across the bioprosthetic valve of 9 mmHg, mild mitral stenosis with mean gradient of 4 mmHg and heart rate of 66 beats per minute.  Severe mitral annular calcification was noted.  The aorta size was reported to be normal.  The patient denies any chest discomfort or  shortness of breath.  In view of port insertion, it looks like his apixaban has been withheld briefly.      PHYSICAL EXAMINATION:     VITALS SIGNS:  Blood pressure 130/68, heart rate 72 and regular, weight 207 pounds, BMI 33.48.   GENERAL:  The patient appears pleasant, comfortable.   NECK:  Normal JVP, no bruit.   CARDIOVASCULAR SYSTEM:  S1, S2 normal.  There is grade 2/6 ejection systolic murmur heard over the right upper sternal border.  No diastolic murmur appreciated.  No S3, S4, rub or gallop.   RESPIRATORY SYSTEM:  Clear to auscultation bilaterally.   GASTROINTESTINAL SYSTEM:  Abdomen soft, nontender.   EXTREMITIES:  No pitting pedal edema.   NEUROLOGIC:  Alert, oriented x3.    PSYCHIATRIC:  Normal affect.    SKIN:  No obvious rash.    HEENT:  No pallor or icterus.      IMPRESSION AND PLAN:  A pleasant 78-year-old gentleman with history of bicuspid aortic valve replacement, single-vessel CABG, normal LV function, mild mitral stenosis in the setting of severe MAC, recently diagnosed with ALL and bladder cancer.  He is now supposed to start chemotherapy for ALL.  Overall, cardiac status-wise, he is doing well without any symptoms of angina or congestive heart failure.  He has also a history of paroxysmal atrial fibrillation and on apixaban for stroke prophylaxis.  That has been withheld briefly, it looks like, in preparation for port insertion.  This could be resumed after the port insertion.  We can see him back in 6 months with repeat echocardiogram.  I am not sure on what chemotherapy the patient will be getting started.  In case patient is getting started on a potential cardiotoxic chemotherapy, he may require several echocardiograms which I will defer to his oncologist, Dr. Jc Eng.  I will be more than happy to help with any cardiac input needed.  At this time, he appears quite stable from a cardiac standpoint of view.        cc:   Jc Eng MD    [facility]   [address]  [place] [address]         ELMA SOLER MD             D: 2018   T: 2018   MT: JUDSON      Name:     ADAM MULLER   MRN:      [id number]        Account:      [id number]   :      1940           Service Date: 2018      Document: [id number]         Outpatient Encounter Medications as of 2018   Medication Sig Dispense Refill     acetaminophen 500 MG CAPS Take 500 mg by mouth daily as needed        ALLOPURINOL PO Take 300 mg by mouth daily. Takes in AM       AMOXICILLIN PO Take 2,000 mg by mouth See Admin Instructions As directed before dental work       Cholecalciferol (VITAMIN D3 PO) Take 2,000 Units by mouth daily        hydrochlorothiazide (MICROZIDE) 12.5 MG capsule Take 25 mg by mouth every morning        irbesartan (AVAPRO) 300 MG tablet Take 1 tablet (300 mg) by mouth daily 90 tablet 3     loperamide (IMODIUM A-D) 2 MG tablet Take 2 mg by mouth 2 times daily as needed        MECLIZINE HCL PO Take 25 mg by mouth every 6 hours as needed        MELATONIN PO Take 3 mg by mouth       metoprolol (TOPROL-XL) 100 MG 24 hr tablet Take 100 mg by mouth daily       pantoprazole (PROTONIX) 40 MG EC tablet Take 1 tablet (40 mg) by mouth 2 times daily (Patient taking differently: Take 40 mg by mouth daily ) 90 tablet 1     predniSONE (DELTASONE) 20 MG tablet Take 120 mg by mouth.       simvastatin (ZOCOR) 40 MG tablet Take 40 mg by mouth At Bedtime        sulfamethoxazole-trimethoprim (BACTRIM DS/SEPTRA DS) 800-160 MG tablet Take 1 tablet by mouth       TRAZODONE HCL PO Take 50 mg by mouth       apixaban ANTICOAGULANT (ELIQUIS) 5 MG tablet Take 1 tablet (5 mg) by mouth 2 times daily (Patient not taking: Reported on 2018)       nystatin (MYCOSTATIN) 524530 UNIT/ML suspension Take 500,000 Units by mouth 4 times daily       sildenafil (VIAGRA) 100 MG tablet Take 1 tablet (100 mg) by mouth daily as needed for erectile dysfunction 30 tablet      [DISCONTINUED]  valsartan-hydrochlorothiazide (DIOVAN HCT) 320-12.5 MG tablet Take 1 tablet by mouth daily       No facility-administered encounter medications on file as of 12/13/2018.        Again, thank you for allowing me to participate in the care of your patient.      Sincerely,    Pierce Fountain MD     [facility]

## 2018-12-13 NOTE — PROGRESS NOTES
Service Date: 12/13/2018      OFFICE PROGRESS NOTE       REASON FOR CARDIOLOGY VISIT:  Follow up AVR, coronary artery disease.      HISTORY OF PRESENT ILLNESS:  Mr. Arango is a very pleasant 78-year-old gentleman with history of bicuspid aortic valve with severe aortic stenosis for which he underwent aortic valve replacement with St. Azam Trifecta valve 23 mm in 04/2013 along with single-vessel CABG, SVG to RPDA.  He has other comorbidities of  stroke and was followed by Neurology and had a Reveal implantable loop recorder at [facility] and was found to have brief episode of paroxysmal atrial fibrillation.  The Reveal device was explanted in 12/2016.  He was started on apixaban.  Last summer, he had GI bleed and melena and was found to have gastric ulcer and duodenal ulcer and underwent treatment for same.  He was started on PPI and eventually started back on apixaban and aspirin was discontinued.  Since that time, he has done quite well.  He had a normal stress test in 10/2016.  Today, he is coming for routine followup.  Recently, he was diagnosed with ALL and bladder cancer.  It looks like the bladder cancer was not invasive.  The ALL requires chemotherapy which he will be starting soon.  He is going to have a port done soon.  He had an echocardiogram done at [facility].  I looked at the report through Care Everywhere.  It showed normal LV function of 65%, normal gradients across the bioprosthetic valve of 9 mmHg, mild mitral stenosis with mean gradient of 4 mmHg and heart rate of 66 beats per minute.  Severe mitral annular calcification was noted.  The aorta size was reported to be normal.  The patient denies any chest discomfort or shortness of breath.  In view of port insertion, it looks like his apixaban has been withheld briefly.      PHYSICAL EXAMINATION:     VITALS SIGNS:  Blood pressure 130/68, heart rate 72 and regular, weight 207 pounds, BMI 33.48.   GENERAL:  The patient appears  pleasant, comfortable.   NECK:  Normal JVP, no bruit.   CARDIOVASCULAR SYSTEM:  S1, S2 normal.  There is grade 2/6 ejection systolic murmur heard over the right upper sternal border.  No diastolic murmur appreciated.  No S3, S4, rub or gallop.   RESPIRATORY SYSTEM:  Clear to auscultation bilaterally.   GASTROINTESTINAL SYSTEM:  Abdomen soft, nontender.   EXTREMITIES:  No pitting pedal edema.   NEUROLOGIC:  Alert, oriented x3.    PSYCHIATRIC:  Normal affect.    SKIN:  No obvious rash.    HEENT:  No pallor or icterus.      IMPRESSION AND PLAN:  A pleasant 78-year-old gentleman with history of bicuspid aortic valve replacement, single-vessel CABG, normal LV function, mild mitral stenosis in the setting of severe MAC, recently diagnosed with ALL and bladder cancer.  He is now supposed to start chemotherapy for ALL.  Overall, cardiac status-wise, he is doing well without any symptoms of angina or congestive heart failure.  He has also a history of paroxysmal atrial fibrillation and on apixaban for stroke prophylaxis.  That has been withheld briefly, it looks like, in preparation for port insertion.  This could be resumed after the port insertion.  We can see him back in 6 months with repeat echocardiogram.  I am not sure on what chemotherapy the patient will be getting started.  In case patient is getting started on a potential cardiotoxic chemotherapy, he may require serial echocardiograms which I will defer to his oncologist, Dr. Jc Eng.  I will be more than happy to help with any cardiac input needed.  At this time, he appears quite stable from a cardiac standpoint of view.        cc:   Jc Eng MD    [facility]   [address] [place] [address]         ELMA SOLER MD             D: 2018   T: 2018   MT: JUDSON      Name:     ADAM MULLER   MRN:      [id number]        Account:      [id number]   :      1940           Service Date:  12/13/2018      Document: [id number]

## 2019-01-01 ENCOUNTER — HOSPITAL ENCOUNTER (OUTPATIENT)
Dept: CARDIOLOGY | Facility: CLINIC | Age: 79
Discharge: HOME | End: 2019-08-23
Attending: INTERNAL MEDICINE | Admitting: INTERNAL MEDICINE
Payer: COMMERCIAL

## 2019-01-01 ENCOUNTER — TELEPHONE (OUTPATIENT)
Dept: CARDIOLOGY | Facility: CLINIC | Age: 79
End: 2019-01-01

## 2019-01-01 DIAGNOSIS — I35.0 AORTIC STENOSIS: ICD-10-CM

## 2019-01-01 DIAGNOSIS — I35.0 AORTIC STENOSIS: Primary | ICD-10-CM

## 2019-01-01 PROCEDURE — 93306 TTE W/DOPPLER COMPLETE: CPT

## 2019-01-01 PROCEDURE — 93306 TTE W/DOPPLER COMPLETE: CPT | Mod: 26 | Performed by: INTERNAL MEDICINE

## 2019-08-26 NOTE — TELEPHONE ENCOUNTER
Bedside and Verbal shift change report given to Vivian Leal RN (oncoming nurse) by Julia Santacruz RN (offgoing nurse). Report included the following information SBAR, Procedure Summary and Recent Results. 0846 Pain medication offered. Offer declined. [id number] Discharge information reviewed with patient. Patient stated understanding. Opportunity for questions given. [address] Discharged patient. Patient ambulated to vehicle for discharge. No complications observed. Patient armband removed and shredded. Left message to return a non emergent phone call to Team 3   Review echo results per Dr. Fountain.    Order placed for 6 month follow up in Epic.

## 2019-08-26 NOTE — TELEPHONE ENCOUNTER
Echo per OV on 12- unable to schedule due to full schedule Plan from last OV  : He has also a history of paroxysmal atrial fibrillation and on apixaban for stroke prophylaxis.  That has been withheld briefly, it looks like, in preparation for port insertion.  This could be resumed after the port insertion.  We can see him back in 6 months with repeat echocardiogram.  I am not sure on what chemotherapy the patient will be getting started.  In case patient is getting started on a potential cardiotoxic chemotherapy, he may require serial echocardiograms which I will defer to his oncologist, Dr. Jc Eng.  I will be more than happy to help with any cardiac input needed.  At this time, he appears quite stable from a cardiac standpoint of view.     Echo results:  Interpretation Summary   There is mild to moderate concentric left ventricular hypertrophy.  The visual ejection fraction is estimated at 65-70% with no regional wall  motion abnormalities noted.  Grade I or early diastolic dysfunction and diastolic Doppler findings (E/E'  ratio = 19.8 and/or other parameters) suggest left ventricular filling  pressures are increased.  There is no mitral regurgitation or mitral valve stenosis.  With the above, noted, there is normal left atrial size by volume measurement  at 22.5 ml/m2. The ULNL for LA size is <34 ml/m2.     There is a bioprosthetic aortic valve with no hemodynamically significant  valvular aortic stenosis. The AoV gradients and data are noted below. The  current gradients are slightly higher than last reported 10-4-2016.     The mean AoV pressure gradient is 17.9 mmHg. The peak AoV pressure gradient is  34.0 mmHg. The calculated aortic valve are is 2.2 cm^2.  ________________________________________    _____________________________________

## 2019-08-26 NOTE — TELEPHONE ENCOUNTER
Normal LVEF and normal functioning bioprosthetic aortic valve. Can see in clinic 6 months, sooner if any change in clinical status.    Thanks  Catie

## 2019-09-03 NOTE — TELEPHONE ENCOUNTER
Patient returned call and was given Echo results normal LVEF follow up aortic valve in 6 months.  Patient verbalized understanding and agreed to plan of care.

## 2022-02-24 NOTE — CONSULTS
"GASTROENTEROLOGY CONSULTATION     Anshul Arango   [address] [place] [address]   76 year old male   Admission Date/Time: 6/25/2017   Encounter Date: 6/26/2017  Primary Care Provider: Jorje Engel     Referring / Attending Physician: Ashutosh Leija   We were asked to see the patient in consultation by Dr. Leija for evaluation of melena.     HPI: Anshul Arango is a 76 year old male with a past medical history significant for a bovine aortic valve replacement maintained on chronic Eliquis along with CAD, previous V fib and cardiac arrest, Atrial Fibrillation, HTN and previous GI bleed who presented to the ED yesterday for evaluation of melena. The patient states that he began noticing dark stools about 4 days ago. He has had what he describes as a \"gut ache\" during that period of time as well.  In the ED he was found to have a hgb of 12.6 which is down from 16.2 one month ago. He was found to have a white count of 27.9. Overnight he states that he had a couple more melenic stools but none so far this morning. He denies nausea or vomiting. He states that he has been trying to lose weight recently.     Past Medical History  Past Medical History:   Diagnosis Date     Acute anterior wall MI (H)      Anemia     iron deficiency     Aortic stenosis     bioprosthetic AVR 4/2013     Arthritis      CAD (coronary artery disease)     s/p CABG (SVG to RPD) 4/2013     Cardiac arrest - ventricular fibrillation      CVA (cerebral vascular accident) (H)     2013, 2014     Gout      Hyperlipaemia      Hypertension      Inguinal hernia without mention of obstruction or gangrene, unilateral or unspecified, (not specified as recurrent)      Laryngeal cancer (H)      Osteoarthrosis      Paroxysmal atrial fibrillation (H)     20 seconds detected on Reveal     Pericardial effusion     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     Sepsis (H)      Sleep apnea     uses CPAP     Upper GI bleed     11/08       Past Surgical " History  Past Surgical History:   Procedure Laterality Date     APPENDECTOMY       bilateral tka       BYPASS GRAFT ARTERY CORONARY, REPLACE VALVE AORTIC, COMBINED  4/9/2013    Procedure: COMBINED BYPASS GRAFT ARTERY CORONARY, REPLACE VALVE AORTIC;  BYPASS GRAFT ARTERY CORONARYX 1 (VEIN GRAFT TO PDA), REPLACE VALVE AORTIC (TRIFECTA 23MM), ENDOSCOPIC VEIN HARVEST(ON PUMP);  Surgeon: Lenny Rowland MD;  Location:  OR     CARDIAC SURGERY  4/2013    AVR for severe aortic stenosis with 1 vessel CABG (SVG to RPDA, 23 mm ST.Azam Trifecta tissue valve)     ENT SURGERY      t & a     HC LOOP RECORDER IMPLANT  2014    [facility]     HERNIA REPAIR       INCISION AND DRAINAGE STERNUM W/ IRRIGATION SYSTEM, COMBINED  5/30/2013    Procedure: COMBINED INCISION AND DRAINAGE STERNUM W/ IRRIGATION SYSTEM;  INCISION AND DRAINAGE OF STERNAL WOUND;  Surgeon: Lenny Rowland MD;  Location:  OR     ORTHOPEDIC SURGERY      anjel tka        Family History  Family History   Problem Relation Age of Onset     Chronic Obstructive Pulmonary Disease Mother      Alzheimer Disease Father      Myocardial Infarction Paternal Grandfather      Myocardial Infarction Brother        Social History  Social History     Social History     Marital status:      Spouse name: N/A     Number of children: N/A     Years of education: N/A     Occupational History     Not on file.     Social History Main Topics     Smoking status: Former Smoker     Packs/day: 1.00     Years: 40.00     Quit date: 4/9/2000     Smokeless tobacco: Not on file     Alcohol use Yes      Comment: one drink/day     Drug use: No     Sexual activity: Not on file     Other Topics Concern     Parent/Sibling W/ Cabg, Mi Or Angioplasty Before 65f 55m? No     Caffeine Concern Yes     drinks de-caf     Sleep Concern Yes     sleep apnea, wears cpap at night     Stress Concern No     Weight Concern No     Special Diet No     Exercise No     nothing regular      Social History  "Narrative       Medications  Prior to Admission medications    Medication Sig Start Date End Date Taking? Authorizing Provider   loperamide (IMODIUM A-D) 2 MG tablet Take 2 mg by mouth 2 times daily as needed    Yes Reported, Patient   hydrochlorothiazide (MICROZIDE) 12.5 MG capsule Take 12.5 mg by mouth every morning   Yes Reported, Patient   metoprolol (TOPROL-XL) 100 MG 24 hr tablet Take 100 mg by mouth daily   Yes Reported, Patient   aspirin 81 MG EC tablet Take 1 tablet (81 mg) by mouth daily 9/19/16  Yes Pierce Fountain MD   valsartan (DIOVAN) 320 MG tablet Take 1 tablet (320 mg) by mouth daily 8/8/16  Yes Miguel Esteves MD   acetaminophen 500 MG CAPS Take 500 mg by mouth daily as needed    Yes Reported, Patient   apixaban ANTICOAGULANT (ELIQUIS) 5 MG tablet Take 1 tablet (5 mg) by mouth 2 times daily 11/3/15  Yes Pierce Fountain MD   simvastatin (ZOCOR) 40 MG tablet Take 40 mg by mouth At Bedtime    Yes Reported, Patient   ALLOPURINOL PO Take 300 mg by mouth daily. Takes in AM   Yes Reported, Patient   Nitroglycerin (NITROSTAT SL) Place 0.4 mg under the tongue every 5 minutes as needed.   Yes Reported, Patient   Cholecalciferol (VITAMIN D3 PO) Take 2,000 Units by mouth daily    Yes Reported, Patient   AMOXICILLIN PO Take 2,000 mg by mouth See Admin Instructions As directed before dental work    Reported, Patient       Allergies:  Codeine sulfate and Caffeine    ROS: A ten point review of systems was obtained and negative other than the symptoms noted above in the HPI.     Physical Exam:   /78 (BP Location: Left arm)  Pulse 117  Temp 97.9  F (36.6  C) (Oral)  Resp 18  Ht 1.676 m (5' 6\")  Wt 91.2 kg (201 lb)  SpO2 97%  BMI 32.44 kg/m2   Constitutional: age appropriate, alert, no acute distress  Cardiovascular: regular rate and rhythm, no murmurs,rubs or gallops  Respiratory: clear to auscultation bilaterally  Psychiatric: normal pleasant affect  Head: atraumatic, normocephalic  Neck: " supple, no thyromegaly  ENT: mucous membranes are moist, no oral lesions are noted  Abdomen: soft, non-tender, non-distended, normally active bowel sound. No masses or hepatosplenomegaly is appreciated. No rebound tenderness or guarding  Neuro: Neurologically intact grossly  Skin: warm, dry, no rashes are noted    ADDITIONAL COMMENTS:   I reviewed the patient's new clinical lab test results.   Recent Labs   Lab Test  06/26/17   0615  06/26/17   0035  06/25/17   1823  06/25/17   1440 06/10/16  09/29/15   2100   06/29/15   0700   WBC   --    --    --   26.5*  12.9  11.1*   < >  7.5  Charge credited  INCORRECTLY CANCELED BY PCU, REORDERED BY LAB     HGB  10.7*  10.3*  11.7*  12.9*  17.5  16.9   < >  14.0  Charge credited  INCORRECTLY CANCELED BY PCU, REORDERED BY LAB     MCV   --    --    --   102*  103.5  97   < >  101*  Charge credited  INCORRECTLY CANCELED BY PCU, REORDERED BY LAB     PLT   --    --    --   207  139*  127*   < >  118*  Charge credited  INCORRECTLY CANCELED BY PCU, REORDERED BY LAB     INR   --    --    --   1.51*   --   1.09   --   1.21*    < > = values in this interval not displayed.      Recent Labs   Lab Test  06/25/17   1440  12/07/16   1245 05/19/16  09/29/15   2100   NA  141  142  142  141   POTASSIUM  3.8  4.5  4.5  3.6   CHLORIDE  110*  108  104  107   CO2  24  27   --   23   BUN  51*  17  17  20   CR  1.11  0.96  1.18  1.21   ANIONGAP  7  7   --   11   ARMEN  8.7  8.3*  9.2  7.4*      Recent Labs   Lab Test  06/25/17   1440  06/26/15   0210  06/25/15   1708 03/24/15  04/01/13   1015   ALBUMIN  3.7   --   3.6  4.2   --    BILITOTAL  0.7   --   0.9  1.0   --    ALT  25   --   39  21   --    AST  28   --   33  30   --    ALKPHOS  65   --   90  94   --    PROTEIN   --   100*   --    --   Negative   LIPASE   --    --   361   --    --       I reviewed the patient's new imaging results.     Assessment:  76 year old male presenting with melena and a drop in hgb. He has risk factors for peptic  ulcer disease. His BUN is elevated suggesting an upper GI source for bleeding. He appears hemodynamically stable at this time. Unclear why he had such dramatic leukocytosis on presentation.    Plan:   -NPO  -Continue holding Eliquis  -Continue PPI drip for now  -EGD today with Dr Craft  -Add on white count to this morning's labs  -Follow hgb and stool output  -GI following    I discussed the patient's findings and plan with Dr. Jorje Craft who will also independently see and examine the patient.     Len Maier PA-C  [facility]  Cell:  [phone] Monday through Friday 9416-9290  Office: [phone]     migue